# Patient Record
Sex: FEMALE | Race: WHITE | NOT HISPANIC OR LATINO | Employment: FULL TIME | URBAN - METROPOLITAN AREA
[De-identification: names, ages, dates, MRNs, and addresses within clinical notes are randomized per-mention and may not be internally consistent; named-entity substitution may affect disease eponyms.]

---

## 2017-09-19 ENCOUNTER — TRANSFERRED RECORDS (OUTPATIENT)
Dept: HEALTH INFORMATION MANAGEMENT | Facility: CLINIC | Age: 67
End: 2017-09-19

## 2018-01-05 ENCOUNTER — OFFICE VISIT (OUTPATIENT)
Dept: OBGYN | Facility: CLINIC | Age: 68
End: 2018-01-05
Payer: COMMERCIAL

## 2018-01-05 ENCOUNTER — RADIANT APPOINTMENT (OUTPATIENT)
Dept: MAMMOGRAPHY | Facility: CLINIC | Age: 68
End: 2018-01-05
Payer: COMMERCIAL

## 2018-01-05 ENCOUNTER — RADIANT APPOINTMENT (OUTPATIENT)
Dept: BONE DENSITY | Facility: CLINIC | Age: 68
End: 2018-01-05
Payer: COMMERCIAL

## 2018-01-05 VITALS
HEART RATE: 70 BPM | SYSTOLIC BLOOD PRESSURE: 126 MMHG | DIASTOLIC BLOOD PRESSURE: 78 MMHG | WEIGHT: 235 LBS | HEIGHT: 69 IN | BODY MASS INDEX: 34.8 KG/M2

## 2018-01-05 DIAGNOSIS — Z13.6 ENCOUNTER FOR LIPID SCREENING FOR CARDIOVASCULAR DISEASE: ICD-10-CM

## 2018-01-05 DIAGNOSIS — Z78.0 ASYMPTOMATIC POSTMENOPAUSAL STATE: ICD-10-CM

## 2018-01-05 DIAGNOSIS — Z12.31 VISIT FOR SCREENING MAMMOGRAM: ICD-10-CM

## 2018-01-05 DIAGNOSIS — Z01.818 PREOP EXAMINATION: ICD-10-CM

## 2018-01-05 DIAGNOSIS — Z01.419 ENCOUNTER FOR GYNECOLOGICAL EXAMINATION WITHOUT ABNORMAL FINDING: Primary | ICD-10-CM

## 2018-01-05 DIAGNOSIS — Z13.1 SCREENING FOR DIABETES MELLITUS: ICD-10-CM

## 2018-01-05 DIAGNOSIS — M21.612 BUNION OF LEFT FOOT: ICD-10-CM

## 2018-01-05 DIAGNOSIS — Z13.220 ENCOUNTER FOR LIPID SCREENING FOR CARDIOVASCULAR DISEASE: ICD-10-CM

## 2018-01-05 DIAGNOSIS — Z23 NEED FOR PROPHYLACTIC VACCINATION AND INOCULATION AGAINST INFLUENZA: ICD-10-CM

## 2018-01-05 LAB — HGB BLD-MCNC: 14.5 G/DL (ref 11.7–15.7)

## 2018-01-05 PROCEDURE — 77063 BREAST TOMOSYNTHESIS BI: CPT | Mod: TC

## 2018-01-05 PROCEDURE — 99397 PER PM REEVAL EST PAT 65+ YR: CPT | Mod: 25 | Performed by: NURSE PRACTITIONER

## 2018-01-05 PROCEDURE — 77067 SCR MAMMO BI INCL CAD: CPT | Mod: TC

## 2018-01-05 PROCEDURE — 90471 IMMUNIZATION ADMIN: CPT | Performed by: NURSE PRACTITIONER

## 2018-01-05 PROCEDURE — 85018 HEMOGLOBIN: CPT | Performed by: NURSE PRACTITIONER

## 2018-01-05 PROCEDURE — 36415 COLL VENOUS BLD VENIPUNCTURE: CPT | Performed by: NURSE PRACTITIONER

## 2018-01-05 PROCEDURE — 77080 DXA BONE DENSITY AXIAL: CPT | Performed by: OBSTETRICS & GYNECOLOGY

## 2018-01-05 PROCEDURE — G0145 SCR C/V CYTO,THINLAYER,RESCR: HCPCS | Performed by: NURSE PRACTITIONER

## 2018-01-05 PROCEDURE — 90662 IIV NO PRSV INCREASED AG IM: CPT | Performed by: NURSE PRACTITIONER

## 2018-01-05 PROCEDURE — 87624 HPV HI-RISK TYP POOLED RSLT: CPT | Performed by: NURSE PRACTITIONER

## 2018-01-05 RX ORDER — LISINOPRIL 5 MG/1
TABLET ORAL
Refills: 0 | COMMUNITY
Start: 2018-01-04 | End: 2020-01-09

## 2018-01-05 ASSESSMENT — PATIENT HEALTH QUESTIONNAIRE - PHQ9
SUM OF ALL RESPONSES TO PHQ QUESTIONS 1-9: 0
5. POOR APPETITE OR OVEREATING: NOT AT ALL

## 2018-01-05 ASSESSMENT — ANXIETY QUESTIONNAIRES
IF YOU CHECKED OFF ANY PROBLEMS ON THIS QUESTIONNAIRE, HOW DIFFICULT HAVE THESE PROBLEMS MADE IT FOR YOU TO DO YOUR WORK, TAKE CARE OF THINGS AT HOME, OR GET ALONG WITH OTHER PEOPLE: NOT DIFFICULT AT ALL
1. FEELING NERVOUS, ANXIOUS, OR ON EDGE: NOT AT ALL
2. NOT BEING ABLE TO STOP OR CONTROL WORRYING: NOT AT ALL
5. BEING SO RESTLESS THAT IT IS HARD TO SIT STILL: NOT AT ALL
3. WORRYING TOO MUCH ABOUT DIFFERENT THINGS: NOT AT ALL
7. FEELING AFRAID AS IF SOMETHING AWFUL MIGHT HAPPEN: NOT AT ALL
GAD7 TOTAL SCORE: 0
6. BECOMING EASILY ANNOYED OR IRRITABLE: NOT AT ALL

## 2018-01-05 NOTE — MR AVS SNAPSHOT
"              After Visit Summary   1/5/2018    Merrily Goodell    MRN: [id number]           Patient Information     Date Of Birth          1950        Visit Information        Provider Department      1/5/2018 2:30 PM Fara Posada APRN CNP [facility]        Today's Diagnoses     Encounter for gynecological examination without abnormal finding    -  1    Preop examination        Bunion of left foot        Encounter for lipid screening for cardiovascular disease        Screening for diabetes mellitus           Follow-ups after your visit        Future tests that were ordered for you today     Open Future Orders        Priority Expected Expires Ordered    Lipid Profile (Chol, Trig, HDL, LDL calc) Routine  1/5/2019 1/5/2018    Glucose, whole blood Routine  1/5/2019 1/5/2018            Who to contact     If you have questions or need follow up information about today's clinic visit or your schedule please contact [facility] directly at [phone].  Normal or non-critical lab and imaging results will be communicated to you by MyChart, letter or phone within 4 business days after the clinic has received the results. If you do not hear from us within 7 days, please contact the clinic through TRADE TO REBATEhart or phone. If you have a critical or abnormal lab result, we will notify you by phone as soon as possible.  Submit refill requests through Seeqpod or call your pharmacy and they will forward the refill request to us. Please allow 3 business days for your refill to be completed.          Additional Information About Your Visit        TRADE TO REBATEhart Information     Seeqpod lets you send messages to your doctor, view your test results, renew your prescriptions, schedule appointments and more. To sign up, go to [email or web address] . Click on \"Log in\" on the left side of the screen, which will take you to the Welcome page. Then click on \"Sign up Now\" on the right side of the page. " "    You will be asked to enter the access code listed below, as well as some personal information. Please follow the directions to create your username and password.     Your access code is: [email or web address]  Expires: 2018  3:13 PM     Your access code will  in 90 days. If you need help or a new code, please call your [place] clinic or [phone].        Care EveryWhere ID     This is your Care EveryWhere ID. This could be used by other organizations to access your [facility] medical records  [id number]        Your Vitals Were     Pulse Height BMI (Body Mass Index)             70 5' 9\" (1.753 m) 34.7 kg/m2          Blood Pressure from Last 3 Encounters:   18 126/78   06/24/15 120/82    Weight from Last 3 Encounters:   18 235 lb (106.6 kg)   06/24/15 202 lb (91.6 kg)              We Performed the Following     Hemoglobin     HPV High Risk Types DNA Cervical     Pap imaged thin layer screen with HPV - recommended age 30 - 65        Primary Care Provider Office Phone # Fax #    [facility] [phone]       [facility] [address] [place] [phone]        Equal Access to Services     FRANTZ CASTANO : Hadii suraj ku hadasho Soomaali, waaxda luqadaha, qaybta kaalmada adeegyada, waxay linda pabon. So [facility] [phone].    ATENCIÓN: Si habla español, tiene a dove disposición servicios gratuitos de asistencia lingüística. Llame al [phone].    We comply with applicable federal civil rights laws and [place] laws. We do not discriminate on the basis of race, color, national origin, age, disability, sex, sexual orientation, or gender identity.            Thank you!     Thank you for choosing [facility] [place]  for your care. Our goal is always to provide you with excellent care. Hearing back from our patients is one way we can continue to improve our services. Please take a few minutes to " complete the written survey that you may receive in the mail after your visit with us. Thank you!             Your Updated Medication List - Protect others around you: Learn how to safely use, store and throw away your medicines at www.disposemymeds.org.          This list is accurate as of: 1/5/18  3:13 PM.  Always use your most recent med list.                   Brand Name Dispense Instructions for use Diagnosis    CENTRUM SILVER per tablet      Take 1 tablet by mouth daily        lisinopril 5 MG tablet    PRINIVIL/ZESTRIL          OMEGA-3 FISH OIL PO      Take by mouth daily        OSTEO BI-FLEX TRIPLE STRENGTH Tabs      Take 1 tablet by mouth daily

## 2018-01-05 NOTE — LETTER
January 11, 2018    Merrily Goodell  [address] [place] [id number]    Dear Lm,  We are happy to inform you that your PAP smear result from 1/5/18 is normal.  We are now able to do a follow up test on PAP smears. The DNA test is for HPV (Human Papilloma Virus). Cervical cancer is closely linked with certain types of HPV. Your result showed no evidence of high risk HPV.  Therefore we recommend you return in 3 years for your next pap smear.  You will still need to return to the clinic every year for an annual exam and other preventive tests.  Please contact the clinic at [phone] with any questions.  Sincerely,    MEENU Byrne CNP/rlm

## 2018-01-05 NOTE — PROGRESS NOTES
Lm is a 67 year old  female who presents for annual exam.     Besides routine health maintenance, she has no other health concerns today .    Do you have a Health Care Directive?: No: Advance care planning was reviewed with patient; patient declined at this time.      Fall risk:   Fallen 2 or more times in the past year?: No  Any fall with injury in the past year?: No      HPI:  The patient's PCP is [facility].  Pt here today for her annual exam and mammogram. She also needs a preop form filled out for her left foot bunion surgery next week by Dr. Duff.     She denies menopausal symptoms. No vaginal bleeding.     She is also having her dexa scan today.     GYNECOLOGIC HISTORY:No LMP recorded. Patient is postmenopausal..   reports that she has never smoked. She has never used smokeless tobacco.      Patient is sexually active.  STD testing offered?  Declined  Last PHQ-9 score on record=   PHQ-9 SCORE 2018   Total Score 0     Last GAD7 score on record=   ABHI-7 SCORE 2018   Total Score 0     Alcohol Score = 1    HEALTH MAINTENANCE:  Cholesterol: 14   Total= 174, Triglycerides=74, HDL=44, XNM=982, FBS=95, TSH=2.61- done on 06/24/15  Last Mammo: 06/24/15, Result: normal, Next Mammo: today   Pap:   Lab Results   Component Value Date    PAP NIL 2015      DEXA:  06/24/15 osteopenia, repeating today  Colonoscopy:  06/15/15, Result:  suboptimal prep, Next Colonoscopy: 2 years.    Health maintenance updated:  yes    HISTORY:  Obstetric History       T0      L3     SAB0   TAB0   Ectopic0   Multiple0   Live Births0       # Outcome Date GA Lbr Adrien/2nd Weight Sex Delivery Anes PTL Lv   3 Para            2 Para            1 Para                 There is no problem list on file for this patient.    Past Surgical History:   Procedure Laterality Date     ELBOW SURGERY       OOPHORECTOMY Left      OOPHORECTOMY Right      TONSILLECTOMY        Social History  "  Substance Use Topics     Smoking status: Never Smoker     Smokeless tobacco: Never Used     Alcohol use Yes           Current Outpatient Prescriptions   Medication Sig     lisinopril (PRINIVIL/ZESTRIL) 5 MG tablet      Multiple Vitamins-Minerals (CENTRUM SILVER) per tablet Take 1 tablet by mouth daily     Omega-3 Fatty Acids (OMEGA-3 FISH OIL PO) Take by mouth daily     Misc Natural Products (OSTEO BI-FLEX TRIPLE STRENGTH) TABS Take 1 tablet by mouth daily     No current facility-administered medications for this visit.        Allergies   Allergen Reactions     Shellfish-Derived Products Anaphylaxis       Past medical, surgical, social and family history were reviewed and updated in EPIC.    ROS:   12 point review of systems negative other than symptoms noted below.  Constitutional: Weight Gain    EXAM:  /78  Pulse 70  Ht 5' 9\" (1.753 m)  Wt 235 lb (106.6 kg)  BMI 34.7 kg/m2   BMI: Body mass index is 34.7 kg/(m^2).    EXAM:  Constitutional: Appearance: Well nourished, well developed alert, in no acute distress  Neck:  Lymph Nodes:  No lymphadenopathy present    Thyroid:  Gland size normal, nontender, no nodules or masses present  on palpation  Chest:  Respiratory Effort:  Breathing unlabored  Cardiovascular:Heart    Auscultation:  Regular rate, normal rhythm, no murmurs present  Breasts: Inspection of Breasts:  No lymphadenopathy present., Palpation of Breasts and Axillae:  No masses present on palpation, no breast tenderness., Axillary Lymph Nodes:  No lymphadenopathy present. and No nodularity, asymmetry or nipple discharge bilaterally.  Gastrointestinal:  Abdominal Examination:  Abdomen nontender to palpation, tone normal without     rigidity or guarding, no masses present, umbilicus without lesions    Liver and speen:  No hepatomegaly present, liver nontender to palpation    Hernias:  No hernias present  Lymphatic: Lymph Nodes:  No other lymphadenopathy present  Skin:  General Inspection:  No rashes " present, no lesions present, no areas of  discoloration.    Genitalia and Groin:  No rashes present, no lesions present, no areas of  discoloration, no masses present  Neurologic/Psychiatric:    Mental Status:  Oriented X3     Pelvic Exam:  External Genitalia:     Normal appearance for age, no discharge present, no tenderness present, no inflammatory lesions present, color normal  Vagina:     Normal vaginal vault without central or paravaginal defects, no discharge present, no inflammatory lesions present, no masses present  Bladder:     Nontender to palpation  Urethra:   Urethral Body:  Urethra palpation normal, urethra structural support normal   Urethral Meatus:  No erythema or lesions present  Cervix:     Appearance healthy, no lesions present, nontender to palpation, no bleeding present  Uterus:     Uterus: firm, normal sized and nontender, anteverted in position.   Adnexa:   Surgically absent  Perineum:     Perineum within normal limits, no evidence of trauma, no rashes or skin lesions present  Anus:     Anus within normal limits, no hemorrhoids present  Inguinal Lymph Nodes:     No lymphadenopathy present  Pubic Hair:     Normal pubic hair distribution for age  Genitalia and Groin:     No rashes present, no lesions present, no areas of discoloration, no masses present      COUNSELING:   Special attention given to:       Regular exercise       Healthy diet/nutrition       Colon cancer screening    BMI:  Body mass index is 34.7 kg/(m^2).  Weight management plan: Discussed healthy diet and exercise guidelines and patient will follow up in 12 months in clinic to re-evaluate.   reports that she has never smoked. She has never used smokeless tobacco.        ASSESSMENT:  67 year old female with satisfactory annual exam.    ICD-10-CM    1. Encounter for gynecological examination without abnormal finding Z01.419 Pap imaged thin layer screen with HPV - recommended age 30 - 65     HPV High Risk Types DNA Cervical   2.  Preop examination Z01.818 Hemoglobin   3. Bunion of left foot M21.612 Hemoglobin   4. Encounter for lipid screening for cardiovascular disease Z13.220 Lipid Profile (Chol, Trig, HDL, LDL calc)    Z13.6    5. Screening for diabetes mellitus Z13.1 Glucose, whole blood       PLAN:  Normal postmenopausal gyn exam. Hemoglobin for preop. Ok to have bunion surgery. Fasting labs in future.  Doing well with lisinopril. DEXA reviewed. Still osteopenic, normal range for her age. Continue calcium and vit d especially with her pending foot surgery.    MEENU Byrne CNP

## 2018-01-05 NOTE — PROGRESS NOTES

## 2018-01-06 ASSESSMENT — ANXIETY QUESTIONNAIRES: GAD7 TOTAL SCORE: 0

## 2018-01-09 ENCOUNTER — TRANSFERRED RECORDS (OUTPATIENT)
Dept: HEALTH INFORMATION MANAGEMENT | Facility: CLINIC | Age: 68
End: 2018-01-09

## 2018-01-09 LAB
COPATH REPORT: NORMAL
PAP: NORMAL

## 2018-01-10 LAB
FINAL DIAGNOSIS: NORMAL
HPV HR 12 DNA CVX QL NAA+PROBE: NEGATIVE
HPV16 DNA SPEC QL NAA+PROBE: NEGATIVE
HPV18 DNA SPEC QL NAA+PROBE: NEGATIVE
SPECIMEN DESCRIPTION: NORMAL

## 2018-01-11 PROBLEM — Z12.4 CERVICAL CANCER SCREENING: Status: ACTIVE | Noted: 2018-01-11

## 2018-02-22 ENCOUNTER — TRANSFERRED RECORDS (OUTPATIENT)
Dept: HEALTH INFORMATION MANAGEMENT | Facility: CLINIC | Age: 68
End: 2018-02-22

## 2018-04-25 ENCOUNTER — TRANSFERRED RECORDS (OUTPATIENT)
Dept: HEALTH INFORMATION MANAGEMENT | Facility: CLINIC | Age: 68
End: 2018-04-25

## 2018-11-03 ENCOUNTER — TRANSFERRED RECORDS (OUTPATIENT)
Dept: HEALTH INFORMATION MANAGEMENT | Facility: CLINIC | Age: 68
End: 2018-11-03

## 2019-01-11 ENCOUNTER — TELEPHONE (OUTPATIENT)
Dept: OBGYN | Facility: CLINIC | Age: 69
End: 2019-01-11

## 2019-01-11 NOTE — TELEPHONE ENCOUNTER
1st attempt. Consent to PHI. Called patient regarding overdue labs. No answer left vm message to schedule lab appt. Labs extended 3 months.

## 2019-12-13 ENCOUNTER — TELEPHONE (OUTPATIENT)
Dept: OBGYN | Facility: CLINIC | Age: 69
End: 2019-12-13

## 2019-12-13 DIAGNOSIS — Z13.1 SCREENING FOR DIABETES MELLITUS: Primary | ICD-10-CM

## 2019-12-13 NOTE — TELEPHONE ENCOUNTER
Patient requesting A1C to be done, can you place an order? Did not want to wait until her physical in Jan. Has a lab appt on 12/16 and has an appt for a flu shot.

## 2019-12-16 ENCOUNTER — ALLIED HEALTH/NURSE VISIT (OUTPATIENT)
Dept: NURSING | Facility: CLINIC | Age: 69
End: 2019-12-16
Payer: COMMERCIAL

## 2019-12-16 DIAGNOSIS — Z13.1 SCREENING FOR DIABETES MELLITUS: ICD-10-CM

## 2019-12-16 DIAGNOSIS — Z13.6 ENCOUNTER FOR LIPID SCREENING FOR CARDIOVASCULAR DISEASE: ICD-10-CM

## 2019-12-16 DIAGNOSIS — Z13.220 ENCOUNTER FOR LIPID SCREENING FOR CARDIOVASCULAR DISEASE: ICD-10-CM

## 2019-12-16 DIAGNOSIS — Z23 NEED FOR PROPHYLACTIC VACCINATION AND INOCULATION AGAINST INFLUENZA: Primary | ICD-10-CM

## 2019-12-16 LAB
CHOLEST SERPL-MCNC: 222 MG/DL
GLUCOSE BLD-MCNC: 98 MG/DL (ref 70–99)
HDLC SERPL-MCNC: 58 MG/DL
LDLC SERPL CALC-MCNC: 148 MG/DL
NONHDLC SERPL-MCNC: 164 MG/DL
TRIGL SERPL-MCNC: 80 MG/DL

## 2019-12-16 PROCEDURE — 80061 LIPID PANEL: CPT | Performed by: NURSE PRACTITIONER

## 2019-12-16 PROCEDURE — 90471 IMMUNIZATION ADMIN: CPT

## 2019-12-16 PROCEDURE — 90662 IIV NO PRSV INCREASED AG IM: CPT

## 2019-12-16 PROCEDURE — 36415 COLL VENOUS BLD VENIPUNCTURE: CPT | Performed by: NURSE PRACTITIONER

## 2019-12-16 PROCEDURE — 82947 ASSAY GLUCOSE BLOOD QUANT: CPT | Performed by: NURSE PRACTITIONER

## 2019-12-16 NOTE — LETTER
12/17/2019     Merrily Goodell  [address] [facility] [phone]        Merrily Goodell your lab results came back slightly abnormal. Your cholesterol panel isn't too bad this year. Watch your diet and increase your exercise. Your fasting blood sugar was normal.       Results for orders placed or performed in visit on 12/16/19   Lipid Profile     Status: Abnormal   Result Value Ref Range    Cholesterol 222 (H) <200 mg/dL    Triglycerides 80 <150 mg/dL    HDL Cholesterol 58 >49 mg/dL    LDL Cholesterol Calculated 148 (H) <100 mg/dL    Non HDL Cholesterol 164 (H) <130 mg/dL   Glucose whole blood     Status: None   Result Value Ref Range    Glucose Whole Blood 98 70 - 99 mg/dL       We'll see you in a few weeks and we can discuss further if need be.    Cordially,    MEENU Byrne CNP

## 2020-01-08 NOTE — PROGRESS NOTES
Lm is a 69 year old  female who presents for annual exam.     Besides routine health maintenance, has a pre-op form to be filled out.    Do you have a Health Care Directive?: No, advance care planning information given to patient to review.  Advanced care planning was discussed at today's visit.    Fall risk:   Fallen 2 or more times in the past year?: No  Any fall with injury in the past year?: No    HPI:  The patient's PCP is  [facility].  Pt here today for her annual gyn exam and mammogram. She is also having her 2nd bunion surgery tomorrow. She had her left foot done last year and plans on her right foot tomorrow with a hardware removal on her left foot with DR. Kamara at [facility].    She has been seeing cardiology for HTN and was on lisinopril but developed itching/bug crawling sensation so she was changed to losartan low dose. Her BP is elevated today.    Denies vaginal bleeding. Pap NIL in 2018. Requests pap screenign today then will space them out.     GYNECOLOGIC HISTORY:  No LMP recorded. Patient is postmenopausal..   reports that she has never smoked. She has never used smokeless tobacco.    Patient is not sexually active.  STD testing offered?  Declined  Last PHQ-9 score on record=   PHQ-9 SCORE 2020   PHQ-9 Total Score 1     Last GAD7 score on record=   ABHI-7 SCORE 2018   Total Score 0 2     Alcohol Score = 1    HEALTH MAINTENANCE:  Cholesterol:   Cholesterol   Date Value Ref Range Status   2019 222 (H) <200 mg/dL Final     Comment:     Desirable:       <200 mg/dl      Last Mammo: One year ago, Result: Normal, Next Mammo: Today   Pap:   Lab Results   Component Value Date    PAP NIL HPV- 2018    PAP NIL 2015      DEXA:  18  Colonoscopy:  6/15/15, Result:  Normal, but suboptimal prep, Next Colonoscopy: will schedule this year years.    Health maintenance updated:  yes    HISTORY:  OB History    Para Term  AB Living   3  "3 0 0 0 3   SAB TAB Ectopic Multiple Live Births   0 0 0 0 0      # Outcome Date GA Lbr Adrien/2nd Weight Sex Delivery Anes PTL Lv   3 Para            2 Para            1 Para              Patient Active Problem List   Diagnosis     Cervical cancer screening     Past Surgical History:   Procedure Laterality Date     ELBOW SURGERY       OOPHORECTOMY Left      OOPHORECTOMY Right      TONSILLECTOMY        Social History     Tobacco Use     Smoking status: Never Smoker     Smokeless tobacco: Never Used   Substance Use Topics     Alcohol use: Yes      Problem (# of Occurrences) Relation (Name,Age of Onset)    Alcoholism (2) Maternal Grandmother, Brother    Cerebrovascular Disease (1) Paternal Grandmother    Colon Cancer (1) Father    Heart Disease (1) Maternal Grandfather    Heart Failure (1) Mother    Lung Cancer (1) Paternal Grandfather    Myocardial Infarction (1) Brother            Current Outpatient Medications   Medication Sig     losartan (COZAAR) 25 MG tablet TAKE 1 TABLET BY MOUTH ONCE DAILY. REPLACING LISINOPRIL     Misc Natural Products (OSTEO BI-FLEX TRIPLE STRENGTH) TABS Take 1 tablet by mouth daily     Multiple Vitamins-Minerals (CENTRUM SILVER) per tablet Take 1 tablet by mouth daily     Omega-3 Fatty Acids (OMEGA-3 FISH OIL PO) Take by mouth daily     UNABLE TO FIND MEDICATION NAME: tumeric curcumin     UNABLE TO FIND MEDICATION NAME: beet root     No current facility-administered medications for this visit.        Allergies   Allergen Reactions     Shellfish-Derived Products Anaphylaxis       Past medical, surgical, social and family history were reviewed and updated in EPIC.    ROS:   12 point review of systems negative other than symptoms noted below or in the HPI.  No urinary frequency or dysuria, bladder or kidney problems    EXAM:  BP (!) 158/90   Pulse 80   Ht 1.734 m (5' 8.25\")   Wt 108.4 kg (239 lb)   BMI 36.07 kg/m     BMI: Body mass index is 36.07 kg/m .    EXAM:  Constitutional: Appearance: " Well nourished, well developed alert, in no acute distress  Neck:  Lymph Nodes:  No lymphadenopathy present    Thyroid:  Gland size normal, nontender, no nodules or masses present  on palpation  Chest:  Respiratory Effort:  Breathing unlabored, chest clear to percussion and auscultation in all fields  Cardiovascular:Heart    Auscultation:  Regular rate, normal rhythm, no murmurs present  Breasts: Inspection of Breasts:  No lymphadenopathy present., Palpation of Breasts and Axillae:  No masses present on palpation, no breast tenderness., Axillary Lymph Nodes:  No lymphadenopathy present. and No nodularity, asymmetry or nipple discharge bilaterally.  Gastrointestinal:  Abdominal Examination:  Abdomen nontender to palpation, tone normal without     rigidity or guarding, no masses present, umbilicus without lesions    Liver and speen:  No hepatomegaly present, liver nontender to palpation    Hernias:  No hernias present  Lymphatic: Lymph Nodes:  No other lymphadenopathy present  Skin:  General Inspection:  No rashes present, no lesions present, no areas of  discoloration.    Genitalia and Groin:  No rashes present, no lesions present, no areas of  discoloration, no masses present  Neurologic/Psychiatric:    Mental Status:  Oriented X3     Pelvic Exam:  External Genitalia:     Normal appearance for age, no discharge present, no tenderness present, no inflammatory lesions present, color normal  Vagina:     Normal vaginal vault without central or paravaginal defects, no discharge present, no inflammatory lesions present, no masses present  Bladder:     Nontender to palpation  Urethra:   Urethral Body:  Urethra palpation normal, urethra structural support normal   Urethral Meatus:  No erythema or lesions present  Cervix:     Appearance healthy, no lesions present, nontender to palpation, no bleeding present  Uterus:     Uterus: firm, normal sized and nontender, midplane in position.   Adnexa:     Surgically  absent.  Perineum:     Perineum within normal limits, no evidence of trauma, no rashes or skin lesions present  Anus:     Anus within normal limits, no hemorrhoids present  Inguinal Lymph Nodes:     No lymphadenopathy present  Pubic Hair:     Normal pubic hair distribution for age  Genitalia and Groin:     No rashes present, no lesions present, no areas of discoloration, no masses present      COUNSELING:   Special attention given to:       Regular exercise       Healthy diet/nutrition       Bladder control       Osteoporosis Prevention/Bone Health    BMI:  Body mass index is 36.07 kg/m .  Weight management plan: Discussed healthy diet and exercise guidelines   reports that she has never smoked. She has never used smokeless tobacco.      ASSESSMENT:  69 year old female with satisfactory annual exam.    ICD-10-CM    1. Encounter for gynecological examination without abnormal finding Z01.419 Pap imaged thin layer screen with HPV - recommended age 30 - 65 years (select HPV order below)     HPV High Risk Types DNA Cervical   2. Preop examination Z01.818    3. Bunion of right foot M21.611        PLAN:  Normal gyn exam. BP elevated today. She is cleared for surgery tomorrow provided her cardiologist is okay with her Blood pressure. She is due to see them on 1/16/2020. Pap collected. Pap every 3 years if NIL today.     MEENU Byrne CNP

## 2020-01-09 ENCOUNTER — OFFICE VISIT (OUTPATIENT)
Dept: OBGYN | Facility: CLINIC | Age: 70
End: 2020-01-09
Payer: COMMERCIAL

## 2020-01-09 ENCOUNTER — ANCILLARY PROCEDURE (OUTPATIENT)
Dept: MAMMOGRAPHY | Facility: CLINIC | Age: 70
End: 2020-01-09
Payer: COMMERCIAL

## 2020-01-09 VITALS
HEIGHT: 68 IN | WEIGHT: 239 LBS | DIASTOLIC BLOOD PRESSURE: 90 MMHG | HEART RATE: 80 BPM | BODY MASS INDEX: 36.22 KG/M2 | SYSTOLIC BLOOD PRESSURE: 158 MMHG

## 2020-01-09 DIAGNOSIS — M21.611 BUNION OF RIGHT FOOT: ICD-10-CM

## 2020-01-09 DIAGNOSIS — Z01.818 PREOP EXAMINATION: ICD-10-CM

## 2020-01-09 DIAGNOSIS — Z12.31 VISIT FOR SCREENING MAMMOGRAM: ICD-10-CM

## 2020-01-09 DIAGNOSIS — Z01.419 ENCOUNTER FOR GYNECOLOGICAL EXAMINATION WITHOUT ABNORMAL FINDING: Primary | ICD-10-CM

## 2020-01-09 PROCEDURE — 77063 BREAST TOMOSYNTHESIS BI: CPT | Mod: TC

## 2020-01-09 PROCEDURE — 77067 SCR MAMMO BI INCL CAD: CPT | Mod: TC

## 2020-01-09 PROCEDURE — 99397 PER PM REEVAL EST PAT 65+ YR: CPT | Performed by: NURSE PRACTITIONER

## 2020-01-09 PROCEDURE — 87624 HPV HI-RISK TYP POOLED RSLT: CPT | Performed by: NURSE PRACTITIONER

## 2020-01-09 PROCEDURE — G0145 SCR C/V CYTO,THINLAYER,RESCR: HCPCS | Performed by: NURSE PRACTITIONER

## 2020-01-09 RX ORDER — LOSARTAN POTASSIUM 25 MG/1
TABLET ORAL
Refills: 3 | Status: ON HOLD | COMMUNITY
Start: 2019-06-16 | End: 2023-01-16

## 2020-01-09 ASSESSMENT — ANXIETY QUESTIONNAIRES
7. FEELING AFRAID AS IF SOMETHING AWFUL MIGHT HAPPEN: NOT AT ALL
1. FEELING NERVOUS, ANXIOUS, OR ON EDGE: SEVERAL DAYS
IF YOU CHECKED OFF ANY PROBLEMS ON THIS QUESTIONNAIRE, HOW DIFFICULT HAVE THESE PROBLEMS MADE IT FOR YOU TO DO YOUR WORK, TAKE CARE OF THINGS AT HOME, OR GET ALONG WITH OTHER PEOPLE: SOMEWHAT DIFFICULT
3. WORRYING TOO MUCH ABOUT DIFFERENT THINGS: NOT AT ALL
5. BEING SO RESTLESS THAT IT IS HARD TO SIT STILL: SEVERAL DAYS
GAD7 TOTAL SCORE: 2
6. BECOMING EASILY ANNOYED OR IRRITABLE: NOT AT ALL
2. NOT BEING ABLE TO STOP OR CONTROL WORRYING: NOT AT ALL

## 2020-01-09 ASSESSMENT — PATIENT HEALTH QUESTIONNAIRE - PHQ9
5. POOR APPETITE OR OVEREATING: NOT AT ALL
SUM OF ALL RESPONSES TO PHQ QUESTIONS 1-9: 1

## 2020-01-09 ASSESSMENT — MIFFLIN-ST. JEOR: SCORE: 1671.57

## 2020-01-09 NOTE — LETTER
January 20, 2020      Merrily Goodell  [address] [place] [phone]    Dear Ms.Goodell,      I am happy to inform you that your cervical cancer screening test (PAP smear) was normal and your Human Papillomavirus (HPV) test was negative.    Per current guidelines, you no longer need to have pap smears completed. Please return for annual pelvic exams.    Please continue to be seen every year for an annual wellness visit and other preventative tests.     If you have additional questions regarding this result, please call our registered nurse, Shannon at [phone].    Sincerely,      Fara Posada, APRN CNP/rlm

## 2020-01-10 LAB — EJECTION FRACTION: 60 %

## 2020-01-10 ASSESSMENT — ANXIETY QUESTIONNAIRES: GAD7 TOTAL SCORE: 2

## 2020-01-15 LAB
COPATH REPORT: NORMAL
PAP: NORMAL

## 2020-01-16 ENCOUNTER — TRANSFERRED RECORDS (OUTPATIENT)
Dept: HEALTH INFORMATION MANAGEMENT | Facility: CLINIC | Age: 70
End: 2020-01-16

## 2020-01-20 LAB
FINAL DIAGNOSIS: NORMAL
HPV HR 12 DNA CVX QL NAA+PROBE: NEGATIVE
HPV16 DNA SPEC QL NAA+PROBE: NEGATIVE
HPV18 DNA SPEC QL NAA+PROBE: NEGATIVE
SPECIMEN DESCRIPTION: NORMAL
SPECIMEN SOURCE CVX/VAG CYTO: NORMAL

## 2020-01-31 ENCOUNTER — TRANSFERRED RECORDS (OUTPATIENT)
Dept: HEALTH INFORMATION MANAGEMENT | Facility: CLINIC | Age: 70
End: 2020-01-31

## 2020-02-19 ENCOUNTER — TRANSFERRED RECORDS (OUTPATIENT)
Dept: HEALTH INFORMATION MANAGEMENT | Facility: CLINIC | Age: 70
End: 2020-02-19

## 2020-03-04 ENCOUNTER — TRANSFERRED RECORDS (OUTPATIENT)
Dept: HEALTH INFORMATION MANAGEMENT | Facility: CLINIC | Age: 70
End: 2020-03-04

## 2020-03-13 ENCOUNTER — TRANSFERRED RECORDS (OUTPATIENT)
Dept: HEALTH INFORMATION MANAGEMENT | Facility: CLINIC | Age: 70
End: 2020-03-13

## 2020-04-02 ENCOUNTER — TRANSFERRED RECORDS (OUTPATIENT)
Dept: HEALTH INFORMATION MANAGEMENT | Facility: CLINIC | Age: 70
End: 2020-04-02

## 2020-11-03 ENCOUNTER — TELEPHONE (OUTPATIENT)
Dept: OBGYN | Facility: CLINIC | Age: 70
End: 2020-11-03

## 2020-11-03 NOTE — TELEPHONE ENCOUNTER
Mariah (home health care coordinator) from [facility] called regarding this mutual patient. Asked if roxana is okay with following home care through phone and fax.  Phone number: [phone]

## 2020-11-04 NOTE — TELEPHONE ENCOUNTER
At this point, given her complexity, she really should establish with an IM provider. I am happy as always to see her yearly. But she should have a general practice provider to captain her ship.

## 2020-11-04 NOTE — TELEPHONE ENCOUNTER
Left message for Mariah Collins instructions below.    Kanika Jerome RN on 11/4/2020 at 10:36 AM

## 2020-11-11 ENCOUNTER — MEDICAL CORRESPONDENCE (OUTPATIENT)
Dept: HEALTH INFORMATION MANAGEMENT | Facility: CLINIC | Age: 70
End: 2020-11-11

## 2020-11-20 ENCOUNTER — MEDICAL CORRESPONDENCE (OUTPATIENT)
Dept: HEALTH INFORMATION MANAGEMENT | Facility: CLINIC | Age: 70
End: 2020-11-20

## 2020-12-02 ENCOUNTER — MEDICAL CORRESPONDENCE (OUTPATIENT)
Dept: HEALTH INFORMATION MANAGEMENT | Facility: CLINIC | Age: 70
End: 2020-12-02

## 2020-12-08 ENCOUNTER — MEDICAL CORRESPONDENCE (OUTPATIENT)
Dept: HEALTH INFORMATION MANAGEMENT | Facility: CLINIC | Age: 70
End: 2020-12-08

## 2020-12-17 ENCOUNTER — MEDICAL CORRESPONDENCE (OUTPATIENT)
Dept: HEALTH INFORMATION MANAGEMENT | Facility: CLINIC | Age: 70
End: 2020-12-17

## 2021-01-12 ENCOUNTER — MEDICAL CORRESPONDENCE (OUTPATIENT)
Dept: HEALTH INFORMATION MANAGEMENT | Facility: CLINIC | Age: 71
End: 2021-01-12

## 2021-02-09 ENCOUNTER — MEDICAL CORRESPONDENCE (OUTPATIENT)
Dept: HEALTH INFORMATION MANAGEMENT | Facility: CLINIC | Age: 71
End: 2021-02-09

## 2021-05-24 ENCOUNTER — RECORDS - HEALTHEAST (OUTPATIENT)
Dept: ADMINISTRATIVE | Facility: CLINIC | Age: 71
End: 2021-05-24

## 2022-10-04 ENCOUNTER — TRANSFERRED RECORDS (OUTPATIENT)
Dept: HEALTH INFORMATION MANAGEMENT | Facility: CLINIC | Age: 72
End: 2022-10-04

## 2022-10-04 LAB — EJECTION FRACTION: 62 %

## 2022-12-15 ENCOUNTER — TRANSFERRED RECORDS (OUTPATIENT)
Dept: HEALTH INFORMATION MANAGEMENT | Facility: CLINIC | Age: 72
End: 2022-12-15

## 2023-01-13 ENCOUNTER — ANESTHESIA EVENT (OUTPATIENT)
Dept: SURGERY | Facility: CLINIC | Age: 73
End: 2023-01-13
Payer: COMMERCIAL

## 2023-01-13 RX ORDER — AMLODIPINE BESYLATE 2.5 MG/1
2.5 TABLET ORAL DAILY
COMMUNITY
End: 2024-01-23

## 2023-01-13 RX ORDER — OMEGA-3 FATTY ACIDS/FISH OIL 300-1000MG
200 CAPSULE ORAL EVERY 4 HOURS PRN
COMMUNITY

## 2023-01-13 RX ORDER — ATORVASTATIN CALCIUM 40 MG/1
40 TABLET, FILM COATED ORAL DAILY
COMMUNITY
End: 2024-01-23

## 2023-01-13 RX ORDER — ASPIRIN 81 MG/1
81 TABLET ORAL DAILY
COMMUNITY

## 2023-01-13 RX ORDER — METOPROLOL TARTRATE 25 MG/1
25 TABLET, FILM COATED ORAL 2 TIMES DAILY
COMMUNITY
End: 2024-01-23

## 2023-01-13 RX ORDER — ACETAMINOPHEN 500 MG
500-1000 TABLET ORAL EVERY 6 HOURS PRN
COMMUNITY
End: 2024-01-23

## 2023-01-16 ENCOUNTER — APPOINTMENT (OUTPATIENT)
Dept: GENERAL RADIOLOGY | Facility: CLINIC | Age: 73
End: 2023-01-16
Attending: ORTHOPAEDIC SURGERY
Payer: COMMERCIAL

## 2023-01-16 ENCOUNTER — HOSPITAL ENCOUNTER (OUTPATIENT)
Facility: CLINIC | Age: 73
Discharge: HOME | End: 2023-01-16
Attending: ORTHOPAEDIC SURGERY | Admitting: ORTHOPAEDIC SURGERY
Payer: COMMERCIAL

## 2023-01-16 ENCOUNTER — ANESTHESIA (OUTPATIENT)
Dept: SURGERY | Facility: CLINIC | Age: 73
End: 2023-01-16
Payer: COMMERCIAL

## 2023-01-16 VITALS
RESPIRATION RATE: 16 BRPM | BODY MASS INDEX: 38.51 KG/M2 | DIASTOLIC BLOOD PRESSURE: 78 MMHG | OXYGEN SATURATION: 97 % | HEIGHT: 69 IN | SYSTOLIC BLOOD PRESSURE: 153 MMHG | WEIGHT: 260 LBS | TEMPERATURE: 97.1 F | HEART RATE: 71 BPM

## 2023-01-16 DIAGNOSIS — Z98.890 S/P FOOT SURGERY: Primary | ICD-10-CM

## 2023-01-16 PROCEDURE — 258N000003 HC RX IP 258 OP 636: Performed by: NURSE ANESTHETIST, CERTIFIED REGISTERED

## 2023-01-16 PROCEDURE — 250N000025 HC SEVOFLURANE, PER MIN: Performed by: ORTHOPAEDIC SURGERY

## 2023-01-16 PROCEDURE — 710N000009 HC RECOVERY PHASE 1, LEVEL 1, PER MIN: Performed by: ORTHOPAEDIC SURGERY

## 2023-01-16 PROCEDURE — 999N000141 HC STATISTIC PRE-PROCEDURE NURSING ASSESSMENT: Performed by: ORTHOPAEDIC SURGERY

## 2023-01-16 PROCEDURE — 250N000013 HC RX MED GY IP 250 OP 250 PS 637: Performed by: PHYSICIAN ASSISTANT

## 2023-01-16 PROCEDURE — 250N000009 HC RX 250: Performed by: ORTHOPAEDIC SURGERY

## 2023-01-16 PROCEDURE — 250N000011 HC RX IP 250 OP 636: Performed by: NURSE ANESTHETIST, CERTIFIED REGISTERED

## 2023-01-16 PROCEDURE — C1713 ANCHOR/SCREW BN/BN,TIS/BN: HCPCS | Performed by: ORTHOPAEDIC SURGERY

## 2023-01-16 PROCEDURE — 250N000011 HC RX IP 250 OP 636: Performed by: ORTHOPAEDIC SURGERY

## 2023-01-16 PROCEDURE — 250N000009 HC RX 250: Performed by: NURSE ANESTHETIST, CERTIFIED REGISTERED

## 2023-01-16 PROCEDURE — 272N000001 HC OR GENERAL SUPPLY STERILE: Performed by: ORTHOPAEDIC SURGERY

## 2023-01-16 PROCEDURE — 271N000001 HC OR GENERAL SUPPLY NON-STERILE: Performed by: ORTHOPAEDIC SURGERY

## 2023-01-16 PROCEDURE — 999N000179 XR SURGERY CARM FLUORO LESS THAN 5 MIN W STILLS

## 2023-01-16 PROCEDURE — 250N000011 HC RX IP 250 OP 636: Performed by: ANESTHESIOLOGY

## 2023-01-16 PROCEDURE — 360N000083 HC SURGERY LEVEL 3 W/ FLUORO, PER MIN: Performed by: ORTHOPAEDIC SURGERY

## 2023-01-16 PROCEDURE — 370N000017 HC ANESTHESIA TECHNICAL FEE, PER MIN: Performed by: ORTHOPAEDIC SURGERY

## 2023-01-16 PROCEDURE — 710N000012 HC RECOVERY PHASE 2, PER MINUTE: Performed by: ORTHOPAEDIC SURGERY

## 2023-01-16 DEVICE — IMPLANTABLE DEVICE: Type: IMPLANTABLE DEVICE | Site: FOOT | Status: FUNCTIONAL

## 2023-01-16 RX ORDER — ONDANSETRON 2 MG/ML
4 INJECTION INTRAMUSCULAR; INTRAVENOUS EVERY 30 MIN PRN
Status: DISCONTINUED | OUTPATIENT
Start: 2023-01-16 | End: 2023-01-16 | Stop reason: HOSPADM

## 2023-01-16 RX ORDER — LABETALOL HYDROCHLORIDE 5 MG/ML
10 INJECTION, SOLUTION INTRAVENOUS
Status: DISCONTINUED | OUTPATIENT
Start: 2023-01-16 | End: 2023-01-16 | Stop reason: HOSPADM

## 2023-01-16 RX ORDER — ROPIVACAINE HYDROCHLORIDE 5 MG/ML
INJECTION, SOLUTION EPIDURAL; INFILTRATION; PERINEURAL PRN
Status: DISCONTINUED | OUTPATIENT
Start: 2023-01-16 | End: 2023-01-16 | Stop reason: HOSPADM

## 2023-01-16 RX ORDER — FENTANYL CITRATE 50 UG/ML
INJECTION, SOLUTION INTRAMUSCULAR; INTRAVENOUS PRN
Status: DISCONTINUED | OUTPATIENT
Start: 2023-01-16 | End: 2023-01-16

## 2023-01-16 RX ORDER — ONDANSETRON 4 MG/1
4 TABLET, ORALLY DISINTEGRATING ORAL
Status: DISCONTINUED | OUTPATIENT
Start: 2023-01-16 | End: 2023-01-16 | Stop reason: HOSPADM

## 2023-01-16 RX ORDER — FENTANYL CITRATE 0.05 MG/ML
25 INJECTION, SOLUTION INTRAMUSCULAR; INTRAVENOUS
Status: DISCONTINUED | OUTPATIENT
Start: 2023-01-16 | End: 2023-01-16 | Stop reason: HOSPADM

## 2023-01-16 RX ORDER — ONDANSETRON 4 MG/1
4 TABLET, ORALLY DISINTEGRATING ORAL EVERY 6 HOURS PRN
Qty: 15 TABLET | Refills: 0 | Status: SHIPPED | OUTPATIENT
Start: 2023-01-16 | End: 2024-01-23

## 2023-01-16 RX ORDER — MAGNESIUM HYDROXIDE 1200 MG/15ML
LIQUID ORAL PRN
Status: DISCONTINUED | OUTPATIENT
Start: 2023-01-16 | End: 2023-01-16 | Stop reason: HOSPADM

## 2023-01-16 RX ORDER — CEFAZOLIN SODIUM/WATER 2 G/20 ML
2 SYRINGE (ML) INTRAVENOUS SEE ADMIN INSTRUCTIONS
Status: DISCONTINUED | OUTPATIENT
Start: 2023-01-16 | End: 2023-01-16 | Stop reason: HOSPADM

## 2023-01-16 RX ORDER — SODIUM CHLORIDE, SODIUM LACTATE, POTASSIUM CHLORIDE, CALCIUM CHLORIDE 600; 310; 30; 20 MG/100ML; MG/100ML; MG/100ML; MG/100ML
INJECTION, SOLUTION INTRAVENOUS CONTINUOUS PRN
Status: DISCONTINUED | OUTPATIENT
Start: 2023-01-16 | End: 2023-01-16

## 2023-01-16 RX ORDER — FENTANYL CITRATE 0.05 MG/ML
50 INJECTION, SOLUTION INTRAMUSCULAR; INTRAVENOUS EVERY 5 MIN PRN
Status: DISCONTINUED | OUTPATIENT
Start: 2023-01-16 | End: 2023-01-16 | Stop reason: HOSPADM

## 2023-01-16 RX ORDER — NALOXONE HYDROCHLORIDE 0.4 MG/ML
0.2 INJECTION, SOLUTION INTRAMUSCULAR; INTRAVENOUS; SUBCUTANEOUS
Status: DISCONTINUED | OUTPATIENT
Start: 2023-01-16 | End: 2023-01-16 | Stop reason: HOSPADM

## 2023-01-16 RX ORDER — MEPERIDINE HYDROCHLORIDE 25 MG/ML
12.5 INJECTION INTRAMUSCULAR; INTRAVENOUS; SUBCUTANEOUS
Status: DISCONTINUED | OUTPATIENT
Start: 2023-01-16 | End: 2023-01-16 | Stop reason: HOSPADM

## 2023-01-16 RX ORDER — AMOXICILLIN 250 MG
1-2 CAPSULE ORAL 2 TIMES DAILY
Qty: 30 TABLET | Refills: 0 | Status: SHIPPED | OUTPATIENT
Start: 2023-01-16 | End: 2024-01-23

## 2023-01-16 RX ORDER — PROPOFOL 10 MG/ML
INJECTION, EMULSION INTRAVENOUS CONTINUOUS PRN
Status: DISCONTINUED | OUTPATIENT
Start: 2023-01-16 | End: 2023-01-16

## 2023-01-16 RX ORDER — HYDROMORPHONE HCL IN WATER/PF 6 MG/30 ML
0.4 PATIENT CONTROLLED ANALGESIA SYRINGE INTRAVENOUS EVERY 5 MIN PRN
Status: DISCONTINUED | OUTPATIENT
Start: 2023-01-16 | End: 2023-01-16 | Stop reason: HOSPADM

## 2023-01-16 RX ORDER — METHOCARBAMOL 750 MG/1
750 TABLET, FILM COATED ORAL
Status: DISCONTINUED | OUTPATIENT
Start: 2023-01-16 | End: 2023-01-16 | Stop reason: HOSPADM

## 2023-01-16 RX ORDER — HYDROXYZINE HYDROCHLORIDE 10 MG/1
10 TABLET, FILM COATED ORAL
Status: DISCONTINUED | OUTPATIENT
Start: 2023-01-16 | End: 2023-01-16 | Stop reason: HOSPADM

## 2023-01-16 RX ORDER — ONDANSETRON 4 MG/1
4 TABLET, ORALLY DISINTEGRATING ORAL EVERY 30 MIN PRN
Status: DISCONTINUED | OUTPATIENT
Start: 2023-01-16 | End: 2023-01-16 | Stop reason: HOSPADM

## 2023-01-16 RX ORDER — SODIUM CHLORIDE, SODIUM LACTATE, POTASSIUM CHLORIDE, CALCIUM CHLORIDE 600; 310; 30; 20 MG/100ML; MG/100ML; MG/100ML; MG/100ML
INJECTION, SOLUTION INTRAVENOUS CONTINUOUS
Status: DISCONTINUED | OUTPATIENT
Start: 2023-01-16 | End: 2023-01-16 | Stop reason: HOSPADM

## 2023-01-16 RX ORDER — FENTANYL CITRATE 0.05 MG/ML
25 INJECTION, SOLUTION INTRAMUSCULAR; INTRAVENOUS EVERY 5 MIN PRN
Status: DISCONTINUED | OUTPATIENT
Start: 2023-01-16 | End: 2023-01-16 | Stop reason: HOSPADM

## 2023-01-16 RX ORDER — CEFAZOLIN SODIUM 1 G/3ML
INJECTION, POWDER, FOR SOLUTION INTRAMUSCULAR; INTRAVENOUS PRN
Status: DISCONTINUED | OUTPATIENT
Start: 2023-01-16 | End: 2023-01-16

## 2023-01-16 RX ORDER — HYDROMORPHONE HCL IN WATER/PF 6 MG/30 ML
0.2 PATIENT CONTROLLED ANALGESIA SYRINGE INTRAVENOUS EVERY 5 MIN PRN
Status: DISCONTINUED | OUTPATIENT
Start: 2023-01-16 | End: 2023-01-16 | Stop reason: HOSPADM

## 2023-01-16 RX ORDER — DEXAMETHASONE SODIUM PHOSPHATE 4 MG/ML
INJECTION, SOLUTION INTRA-ARTICULAR; INTRALESIONAL; INTRAMUSCULAR; INTRAVENOUS; SOFT TISSUE PRN
Status: DISCONTINUED | OUTPATIENT
Start: 2023-01-16 | End: 2023-01-16

## 2023-01-16 RX ORDER — HYDRALAZINE HYDROCHLORIDE 20 MG/ML
2.5-5 INJECTION INTRAMUSCULAR; INTRAVENOUS EVERY 10 MIN PRN
Status: DISCONTINUED | OUTPATIENT
Start: 2023-01-16 | End: 2023-01-16 | Stop reason: HOSPADM

## 2023-01-16 RX ORDER — CEFAZOLIN SODIUM/WATER 2 G/20 ML
2 SYRINGE (ML) INTRAVENOUS
Status: DISCONTINUED | OUTPATIENT
Start: 2023-01-16 | End: 2023-01-16 | Stop reason: HOSPADM

## 2023-01-16 RX ORDER — NALOXONE HYDROCHLORIDE 0.4 MG/ML
0.4 INJECTION, SOLUTION INTRAMUSCULAR; INTRAVENOUS; SUBCUTANEOUS
Status: DISCONTINUED | OUTPATIENT
Start: 2023-01-16 | End: 2023-01-16 | Stop reason: HOSPADM

## 2023-01-16 RX ORDER — LIDOCAINE HYDROCHLORIDE 20 MG/ML
INJECTION, SOLUTION INFILTRATION; PERINEURAL PRN
Status: DISCONTINUED | OUTPATIENT
Start: 2023-01-16 | End: 2023-01-16

## 2023-01-16 RX ORDER — ROPIVACAINE HYDROCHLORIDE 5 MG/ML
INJECTION, SOLUTION EPIDURAL; INFILTRATION; PERINEURAL
Status: DISCONTINUED
Start: 2023-01-16 | End: 2023-01-16 | Stop reason: HOSPADM

## 2023-01-16 RX ORDER — HYDROCODONE BITARTRATE AND ACETAMINOPHEN 5; 325 MG/1; MG/1
1-2 TABLET ORAL EVERY 4 HOURS PRN
Qty: 30 TABLET | Refills: 0 | Status: SHIPPED | OUTPATIENT
Start: 2023-01-16 | End: 2024-01-23

## 2023-01-16 RX ORDER — HYDROCODONE BITARTRATE AND ACETAMINOPHEN 5; 325 MG/1; MG/1
1 TABLET ORAL
Status: COMPLETED | OUTPATIENT
Start: 2023-01-16 | End: 2023-01-16

## 2023-01-16 RX ORDER — PROPOFOL 10 MG/ML
INJECTION, EMULSION INTRAVENOUS PRN
Status: DISCONTINUED | OUTPATIENT
Start: 2023-01-16 | End: 2023-01-16

## 2023-01-16 RX ORDER — ONDANSETRON 2 MG/ML
INJECTION INTRAMUSCULAR; INTRAVENOUS PRN
Status: DISCONTINUED | OUTPATIENT
Start: 2023-01-16 | End: 2023-01-16

## 2023-01-16 RX ADMIN — PROPOFOL 50 MCG/KG/MIN: 10 INJECTION, EMULSION INTRAVENOUS at 13:05

## 2023-01-16 RX ADMIN — ONDANSETRON 4 MG: 2 INJECTION INTRAMUSCULAR; INTRAVENOUS at 13:13

## 2023-01-16 RX ADMIN — LIDOCAINE HYDROCHLORIDE 100 MG: 20 INJECTION, SOLUTION INFILTRATION; PERINEURAL at 13:04

## 2023-01-16 RX ADMIN — FENTANYL CITRATE 50 MCG: 50 INJECTION, SOLUTION INTRAMUSCULAR; INTRAVENOUS at 13:08

## 2023-01-16 RX ADMIN — SODIUM CHLORIDE, POTASSIUM CHLORIDE, SODIUM LACTATE AND CALCIUM CHLORIDE: 600; 310; 30; 20 INJECTION, SOLUTION INTRAVENOUS at 13:00

## 2023-01-16 RX ADMIN — DEXAMETHASONE SODIUM PHOSPHATE 4 MG: 4 INJECTION, SOLUTION INTRA-ARTICULAR; INTRALESIONAL; INTRAMUSCULAR; INTRAVENOUS; SOFT TISSUE at 13:13

## 2023-01-16 RX ADMIN — FENTANYL CITRATE 25 MCG: 50 INJECTION, SOLUTION INTRAMUSCULAR; INTRAVENOUS at 14:01

## 2023-01-16 RX ADMIN — HYDROCODONE BITARTRATE AND ACETAMINOPHEN 1 TABLET: 5; 325 TABLET ORAL at 14:47

## 2023-01-16 RX ADMIN — PROPOFOL 100 MG: 10 INJECTION, EMULSION INTRAVENOUS at 13:08

## 2023-01-16 RX ADMIN — CEFAZOLIN 2 G: 1 INJECTION, POWDER, FOR SOLUTION INTRAMUSCULAR; INTRAVENOUS at 13:00

## 2023-01-16 RX ADMIN — FENTANYL CITRATE 50 MCG: 50 INJECTION, SOLUTION INTRAMUSCULAR; INTRAVENOUS at 13:04

## 2023-01-16 RX ADMIN — PHENYLEPHRINE HYDROCHLORIDE 50 MCG: 10 INJECTION INTRAVENOUS at 13:22

## 2023-01-16 RX ADMIN — PROPOFOL 200 MG: 10 INJECTION, EMULSION INTRAVENOUS at 13:04

## 2023-01-16 RX ADMIN — PHENYLEPHRINE HYDROCHLORIDE 50 MCG: 10 INJECTION INTRAVENOUS at 13:30

## 2023-01-16 RX ADMIN — PHENYLEPHRINE HYDROCHLORIDE 100 MCG: 10 INJECTION INTRAVENOUS at 13:33

## 2023-01-16 RX ADMIN — PHENYLEPHRINE HYDROCHLORIDE 100 MCG: 10 INJECTION INTRAVENOUS at 13:24

## 2023-01-16 ASSESSMENT — ACTIVITIES OF DAILY LIVING (ADL)
ADLS_ACUITY_SCORE: 35
ADLS_ACUITY_SCORE: 35

## 2023-01-16 ASSESSMENT — LIFESTYLE VARIABLES: TOBACCO_USE: 0

## 2023-01-16 NOTE — ANESTHESIA PROCEDURE NOTES
Airway       Patient location during procedure: OR  Staff -        Anesthesiologist:  David Fuchs MD       CRNA: Angeles Campos APRN CRNA       Other Anesthesia Staff: Maddi Link       Performed By: SRNA  Consent for Airway        Urgency: elective  Indications and Patient Condition       Indications for airway management: nabeel-procedural       Induction type:intravenous       Mask difficulty assessment: 0 - not attempted    Final Airway Details       Final airway type: supraglottic airway    Supraglottic Airway Details        Type: LMA       Brand: Ambu AuraGain       LMA size: 4       Cuff Pressure (cm H2O): 20    Post intubation assessment        Placement verified by: capnometry, equal breath sounds and chest rise        Number of attempts at approach: 1       Secured with: commercial tube salgado and pink tape       Ease of procedure: easy       Dentition: Intact and Unchanged

## 2023-01-16 NOTE — OP NOTE
Procedure Date: 01/16/2023    PREOPERATIVE DIAGNOSES:     1.  Failure of a previous left bunion repair done elsewhere.  2.  Retained hardware, first tarsometatarsal joint, left foot.    POSTOPERATIVE DIAGNOSES:    1.  Failure of a previous left bunion repair done elsewhere.  2.  Retained hardware, first tarsometatarsal joint, left foot.    SURGICAL PROCEDURE:    1.  Revision left bunion repair with boat metatarsal osteotomy as well as an Akin osteotomy of the proximal phalanx.   2.  Removal of a plate and screws from the left first tarsometatarsal joint.    ANESTHETIC:  General.    SURGEON:  Torey Kamara M.D.     ASSISTANT:  Elio Brown PA-C    NEED FOR AN ASSISTANT:  A skilled first assistant was necessary through all portions of the case in order to assist with positioning, retraction, wound closure, dressing and splint application.    PREAMBLE:  Ms. Goodell had Lapidus bunion repair done in the past at an outside facility.  She unfortunately has a significant recurrence of her metatarsus primus varus, hallux valgus and also a very significant hallux rigidus with only 15 degrees dorsiflexion of her great toe MTP joint.    Informed consent was obtained for above-mentioned procedure.    DESCRIPTION OF PROCEDURE:  After adequate induction of a general anesthetic, the patient was positioned supine on the operating table.  The left leg was sterilely prepped and free draped in the usual fashion.  Tourniquet around the thigh was inflated to 300 mmHg.    The previous dorsal medial incision was used over the first tarsometatarsal joint.  The plate and 4 screws were removed.  There was an interfragmentary screw that was completely covered by bone and that was not retrieved.  This was followed by a 5 cm incision along the medial border of the MTP joint.  The capsule was incised longitudinally.  The medial eminence was removed with a saw.  This was followed by a 90-degree metatarsal osteotomy with a long plantar limb.   The capital fragment was translated laterally by 5 mm and immobilized with 2 small screws.  A separate incision was made in the first webspace to do an adductor release.  This was then followed by a 20-degree medially based and 25-degree dorsally based combination Akin/Grabiel osteotomy of the proximal phalanx to correct the hallux interphalangeus and also increase dorsiflexion of the toe.  The osteotomy was secured with a suture with excellent alignment.    The medial capsule was repaired.  The wound was closed in layers.  A sterile dressing and a light compressive bandage applied followed by a bunion splint.  She tolerated the procedure well and was taken to the recovery room in satisfactory condition.    She can ambulate weightbearing as tolerated.  Sutures will be removed in 2 weeks.     Torey Kamara MD        D: 2023   T: 2023   MT: JACKSON    Name:     GOODELL, MERRILY A.  MRN:      [id number]        Account:        [id number]   :      1950           Procedure Date: 2023     Document: [id number]

## 2023-01-16 NOTE — ANESTHESIA POSTPROCEDURE EVALUATION
Patient: Merrily Ann Goodell    Procedure: Procedure(s):  LEFT REVISION BUNION REPAIR AND  REMOVAL PLATE AND SCREWS       Anesthesia Type:  General    Note:  Disposition: Outpatient   Postop Pain Control: Uneventful            Sign Out: Well controlled pain   PONV: No   Neuro/Psych: Uneventful            Sign Out: Acceptable/Baseline neuro status   Airway/Respiratory: Uneventful            Sign Out: Acceptable/Baseline resp. status   CV/Hemodynamics: Uneventful            Sign Out: Acceptable CV status; No obvious hypovolemia; No obvious fluid overload   Other NRE: NONE   DID A NON-ROUTINE EVENT OCCUR? No           Last vitals:  Vitals Value Taken Time   /73 01/16/23 1345   Temp 36.1  C (96.9  F) 01/16/23 1345   Pulse 61 01/16/23 1356   Resp 46 01/16/23 1356   SpO2 99 % 01/16/23 1356   Vitals shown include unvalidated device data.    Electronically Signed By: David Fuchs MD  January 16, 2023  1:57 PM

## 2023-01-16 NOTE — DISCHARGE INSTRUCTIONS
Same Day Surgery Discharge Instructions for  Sedation and General Anesthesia     It's not unusual to feel dizzy, light-headed or faint for up to 24 hours after surgery or while taking pain medication.  If you have these symptoms: sit for a few minutes before standing and have someone assist you when you get up to walk or use the bathroom.    You should rest and relax for the next 24 hours. We recommend you make arrangements to have an adult stay with you for at least 24 hours after your discharge.  Avoid hazardous and strenuous activity.    DO NOT DRIVE any vehicle or operate mechanical equipment for 24 hours following the end of your surgery.  Even though you may feel normal, your reactions may be affected by the medication you have received.    Do not drink alcoholic beverages for 24 hours following surgery.     Slowly progress to your regular diet as you feel able. It's not unusual to feel nauseated and/or vomit after receiving anesthesia.  If you develop these symptoms, drink clear liquids (apple juice, ginger ale, broth, 7-up, etc. ) until you feel better.  If your nausea and vomiting persists for 24 hours, please notify your surgeon.      All narcotic pain medications, along with inactivity and anesthesia, can cause constipation. Drinking plenty of liquids and increasing fiber intake will help.    For any questions of a medical nature, call your surgeon.    Do not make important decisions for 24 hours.    If you had general anesthesia, you may have a sore throat for a couple of days related to the breathing tube used during surgery.  You may use Cepacol lozenges to help with this discomfort.  If it worsens or if you develop a fever, contact your surgeon.     If you feel your pain is not well managed with the pain medications prescribed by your surgeon, please contact your surgeon's office to let them know so they can address your concerns.           Crutch Instructions    Because of your surgery you will  "need crutches.  Make sure you tell your healthcare provider if crutches do not seem to work for you.  Using Crutches   Crutches are the most commonly used device for injuries to the lower part of the body because they allow the most mobility. All walking assistance devices require strength in your upper body but crutches require more coordination. If you are unable to use crutches then a cane or walker may be better.   Remember these rules when using crutches:   Always look straight ahead when you walk. Do not look down.   Hold the top padded part of the crutches into your chest near your armpits. Grasp the padded hand  and always support your weight with your arms and hands.   Do not lean on the crutches with your armpit as this could cause nerve damage.  Standing Up  Make sure you are in a stable chair. Move forward to the edge of the chair so that your  good  foot is flat on the floor. Put both crutches together and hold the handgrips in one hand. Stretch the injured leg out straight and then use the crutches with one hand and the chair armrest with the other hand to push yourself into a standing position onto your  good  leg. Once you are standing, wait until you are steady before placing a crutch in each hand. Until you become good at standing, have someone assist you in case you lose your balance. When standing still, lean slightly forward with the crutches ahead of you and about 3 feet apart. Unless you are told otherwise, you should keep weight off your injured leg.  Walking  To begin crutch walking, balance yourself on your  good  leg. Move both crutches forward about the length of one step and place them firmly on the floor in front of you about 3 feet apart. Support your weight on the padded hand rests while leaning forward. Press the top of the crutches against your chest wall and not into your armpits. Be sure to hold the injured leg up off of the floor. When ready, swing the \"good\" leg forward " "about one step length past the crutches while supporting your weight on the padded hand . Be careful not to go too far. Transfer your weight onto the \"good\" leg then bring both crutches forward about the length of one step. Repeating this motion will allow you to move fairly quickly without the use of your injured leg. Keep practicing until you become good at crutch walking.  Sitting Down  Make sure the chair you are about to sit on is stable. Walk in front of the chair such that you are facing away from it. Move back a little at a time until the back of your  good  leg is nearly touching the seat. Keeping your weight on the  good  leg, move both crutches to one hand holding onto the handgrips. Lean forward, bend your  good  knee, and move your injured leg out forward. Sit down slowly while using your free hand to reach for the chair s armrest for support. Place the crutches where you can easily get them. Never pivot, even on your  good  leg. This could cause you to fall or injure your  good  leg.  Navigating Stairs, Curbs & Door Steps  Only attempt this once you are very comfortable with regular crutch walking and only when someone is there to steady you should you begin to lose your balance. Hold on to a  railing with one hand and both crutches in the other hand or have someone carry the loose crutch for you. It does not matter which side the handrail is on. If there is no handrail, you can use both crutches. Using only crutches is the same as the railing method but maintaining your balance can be difficult. The crutch-only method is not recommended until you have become very good at crutch walking. Be sure to only take one step at a time. A simple rule to remember for crutches when navigating stairs or curbs: up with the  good  leg and down with the  bad .  Going Up Stairs or Curbs  Walk close to the first step with the crutches slightly behind you. Push down on the handrail and the crutch and " "step up with the \"good\" leg. You may have to make a short hop to do this if you are not allowed to place any weight on the injured leg. Lean forward and bring the injured leg and the crutch up beside the \"good\" leg. Repeat this until you have climbed up all of the steps. Remember, the \"good\" leg goes up first and the crutches move with the injured leg. The person helping you should stand behind and to your side that is away from the railing.  Going Down Stairs or Curbs  Walk to the edge of the first step. While standing and balancing on the  good  leg, place both crutches in one hand and then down on the step below. Be sure to support your weight by leaning on the crutches and handrail. Bring the injured leg forward, then the \"good\" leg down to the same step as the crutches. Remember crutches go down first with the injured leg. The person helping you should  front and to your side that is away from the railing.  Sitting Method for Navigating Stairs  A safer way to get up and down stairs is to sit down. To go up steps, sit down on the second or third step. Push with your  good  leg below while pushing up with both arms on the step above to move your bottom to the next step. When you get to the top of the stairs you may need to use the  scooting  method described later to get back up. To go down steps you first need to lower yourself to the floor near the top of the steps. Once seated, support yourself with your  good  leg on the second to next step below, and push with both arms on the step where you are sitting to move your bottom down to the next step. When you reach the bottom, pull yourself up to standing position using your crutches. It is helpful if someone can move your crutches and assist you in getting up and down. With practice it may be possible to manage on your own.  If You Start To Fall  If you start to fall and cannot get your balance, throw the crutches away from you. Try to fall onto your " " good  side away from your injury and then break your fall with your arms. If uninjured, you can usually get back up by moving into a sitting position and scooting to a chair. Push with your arms and hands on the chair seat while pushing with the \"good\" leg to get up into the chair. You should not attempt to get back up if you are having significant pain. Call for assistance or dial 911 for an ambulance if necessary.  Safety Tips for Crutch Walking   At first you may want to wear a training belt (or a strong regular belt) so others can assist you.   Do not use crutches if you feel dizzy or drowsy.   Be careful on slick or wet surfaces like a kitchen or bathroom floor, snow, ice, or rainy conditions.   Temporarily remove pets, throw rugs or other items from your home that might trip you.   Do not hop around while holding onto furniture.   Wear sneakers or rubber soled shoes that will not easily slip or come off.   Be careful on ramps or slopes as they can be harder to walk up or down   Do not remove any parts from your crutches especially the padding or rubber traction footings. Replace padding or footings that become damaged immediately.   It is important to use your crutches correctly. If you feel any numbness or tingling in your arms, you are probably using the crutches incorrectly.  Helpful Hints   A bedside toilet or toilet riser may be helpful.   Always allow for extra time to get around. Children in school should be given permission to leave class early to avoid crowds when changing classes.   Elevate the injured leg when sitting.   Use a backpack to carry books or waist pouch to carry other items so that both hands are free.   Call your healthcare provider if you have any questions or concerns.                 ** If you have questions or concerns about your procedure,   call Dr. Kamara at [phone] **        "

## 2023-01-16 NOTE — ANESTHESIA PREPROCEDURE EVALUATION
Anesthesia Pre-Procedure Evaluation    Patient: Merrily Ann Goodell   MRN: [id number] : 1950        Procedure : Procedure(s):  LEFT REVISION BUNION REPAIR AND  REMOVAL PLATE AND SCREWS          Past Medical History:   Diagnosis Date     Aortic stenosis      Heart murmur      Hypertension       Past Surgical History:   Procedure Laterality Date     AORTIC VALVE REPLACEMENT       ELBOW SURGERY       FOOT SURGERY       HYSTERECTOMY       OOPHORECTOMY Left      OOPHORECTOMY Right      TONSILLECTOMY        Allergies   Allergen Reactions     Shellfish-Derived Products Anaphylaxis      Social History     Tobacco Use     Smoking status: Never     Smokeless tobacco: Never   Substance Use Topics     Alcohol use: Yes      Wt Readings from Last 1 Encounters:   20 108.4 kg (239 lb)        Anesthesia Evaluation   Pt has had prior anesthetic.     No history of anesthetic complications       ROS/MED HX  ENT/Pulmonary:  - neg pulmonary ROS  (-) tobacco use and sleep apnea   Neurologic:  - neg neurologic ROS     Cardiovascular:     (+) hypertension-----valvular problems/murmurs type: AS s/p AVR.     METS/Exercise Tolerance:     Hematologic:       Musculoskeletal:       GI/Hepatic:    (-) GERD   Renal/Genitourinary:  - neg Renal ROS     Endo:  - neg endo ROS  (-) Type II DM   Psychiatric/Substance Use:       Infectious Disease:       Malignancy:       Other:            Physical Exam    Airway        Mallampati: II   TM distance: > 3 FB   Neck ROM: full   Mouth opening: > 3 cm    Respiratory Devices and Support         Dental       (+) implants      Cardiovascular   cardiovascular exam normal          Pulmonary   pulmonary exam normal                OUTSIDE LABS:  CBC:   Lab Results   Component Value Date    HGB 14.5 2018    HGB 14.3 2015     BMP: No results found for: NA, POTASSIUM, CHLORIDE, CO2, BUN, CR, GLC  COAGS: No results found for: PTT, INR, FIBR  POC: No results found for: BGM, HCG, HCGS  HEPATIC: No  results found for: ALBUMIN, PROTTOTAL, ALT, AST, GGT, ALKPHOS, BILITOTAL, BILIDIRECT, SOREN  OTHER:   Lab Results   Component Value Date    DEEPA 8.8 06/24/2015    TSH 2.61 06/24/2015       Anesthesia Plan    ASA Status:  3   NPO Status:  NPO Appropriate    Anesthesia Type: General.     - Airway: LMA   Induction: Intravenous, Propofol.   Maintenance: Balanced.        Consents    Anesthesia Plan(s) and associated risks, benefits, and realistic alternatives discussed. Questions answered and patient/representative(s) expressed understanding.    - Discussed:     - Discussed with:  Patient         Postoperative Care    Pain management: IV analgesics.   PONV prophylaxis: Ondansetron (or other 5HT-3), Background Propofol Infusion     Comments:                David Fuchs MD

## 2023-01-16 NOTE — ANESTHESIA CARE TRANSFER NOTE
Patient: Merrily Ann Goodell    Procedure: Procedure(s):  LEFT REVISION BUNION REPAIR AND  REMOVAL PLATE AND SCREWS       Diagnosis: Bunion [M21.619]  Diagnosis Additional Information: No value filed.    Anesthesia Type:   General     Note:    Oropharynx: oropharynx clear of all foreign objects  Level of Consciousness: drowsy  Oxygen Supplementation: face mask  Level of Supplemental Oxygen (L/min / FiO2): 10  Independent Airway: airway patency satisfactory and stable  Dentition: dentition unchanged  Vital Signs Stable: post-procedure vital signs reviewed and stable  Report to RN Given: handoff report given  Patient transferred to: PACU  Comments: Pt exhibits spontaneous respirations, follows commands, suctioned, LMA removed, exchanging well, transferred to pacu with O2 @ 10L via mask, all monitors and alarms on, report to RN, VSS.  Handoff Report: Identifed the Patient, Identified the Reponsible Provider, Reviewed the pertinent medical history, Discussed the surgical course, Reviewed Intra-OP anesthesia mangement and issues during anesthesia, Set expectations for post-procedure period and Allowed opportunity for questions and acknowledgement of understanding      Vitals:  Vitals Value Taken Time   BP     Temp     Pulse     Resp     SpO2         Electronically Signed By: MEENU Ro CRNA  January 16, 2023  1:45 PM

## 2023-01-16 NOTE — OR NURSING
Up to BR voids light pale ellow X1- AOX3-VSS-O2 sats >92% RA- Good PO intake, pain tolerable 2/10 - Pt and responsible adult verbalize understanding of discharge instructions, denies questions. Up in W/C - transported to door for discharge to home.

## 2023-01-16 NOTE — INTERVAL H&P NOTE
I have reviewed the surgical (or preoperative) H&P that is linked to this encounter, and examined the patient. There are no significant changes    Clinical Conditions Present on Arrival:  Clinically Significant Risk Factors Present on Admission                         
I have reviewed and confirmed nurses' notes for patient's medications, allergies, medical history, and surgical history.

## 2023-01-31 ENCOUNTER — TRANSFERRED RECORDS (OUTPATIENT)
Dept: HEALTH INFORMATION MANAGEMENT | Facility: CLINIC | Age: 73
End: 2023-01-31

## 2023-02-23 ENCOUNTER — TRANSFERRED RECORDS (OUTPATIENT)
Dept: HEALTH INFORMATION MANAGEMENT | Facility: CLINIC | Age: 73
End: 2023-02-23

## 2023-04-02 ENCOUNTER — HEALTH MAINTENANCE LETTER (OUTPATIENT)
Age: 73
End: 2023-04-02

## 2023-04-18 ENCOUNTER — TRANSFERRED RECORDS (OUTPATIENT)
Dept: HEALTH INFORMATION MANAGEMENT | Facility: CLINIC | Age: 73
End: 2023-04-18
Payer: COMMERCIAL

## 2023-05-20 ENCOUNTER — ANESTHESIA EVENT (OUTPATIENT)
Dept: SURGERY | Facility: CLINIC | Age: 73
End: 2023-05-20
Payer: COMMERCIAL

## 2023-05-22 ENCOUNTER — HOSPITAL ENCOUNTER (OUTPATIENT)
Facility: CLINIC | Age: 73
Discharge: HOME | End: 2023-05-22
Attending: ORTHOPAEDIC SURGERY | Admitting: ORTHOPAEDIC SURGERY
Payer: COMMERCIAL

## 2023-05-22 ENCOUNTER — ANESTHESIA (OUTPATIENT)
Dept: SURGERY | Facility: CLINIC | Age: 73
End: 2023-05-22
Payer: COMMERCIAL

## 2023-05-22 ENCOUNTER — APPOINTMENT (OUTPATIENT)
Dept: GENERAL RADIOLOGY | Facility: CLINIC | Age: 73
End: 2023-05-22
Attending: ORTHOPAEDIC SURGERY
Payer: COMMERCIAL

## 2023-05-22 VITALS
BODY MASS INDEX: 39.37 KG/M2 | RESPIRATION RATE: 18 BRPM | HEIGHT: 69 IN | OXYGEN SATURATION: 96 % | WEIGHT: 265.8 LBS | DIASTOLIC BLOOD PRESSURE: 67 MMHG | HEART RATE: 63 BPM | SYSTOLIC BLOOD PRESSURE: 131 MMHG | TEMPERATURE: 98.5 F

## 2023-05-22 DIAGNOSIS — Z98.890 S/P FOOT SURGERY: Primary | ICD-10-CM

## 2023-05-22 PROCEDURE — 250N000011 HC RX IP 250 OP 636: Performed by: ANESTHESIOLOGY

## 2023-05-22 PROCEDURE — 710N000012 HC RECOVERY PHASE 2, PER MINUTE: Performed by: ORTHOPAEDIC SURGERY

## 2023-05-22 PROCEDURE — C1713 ANCHOR/SCREW BN/BN,TIS/BN: HCPCS | Performed by: ORTHOPAEDIC SURGERY

## 2023-05-22 PROCEDURE — 250N000013 HC RX MED GY IP 250 OP 250 PS 637: Performed by: PHYSICIAN ASSISTANT

## 2023-05-22 PROCEDURE — 250N000025 HC SEVOFLURANE, PER MIN: Performed by: ORTHOPAEDIC SURGERY

## 2023-05-22 PROCEDURE — 250N000011 HC RX IP 250 OP 636: Performed by: ORTHOPAEDIC SURGERY

## 2023-05-22 PROCEDURE — 250N000011 HC RX IP 250 OP 636: Performed by: PHYSICIAN ASSISTANT

## 2023-05-22 PROCEDURE — 360N000076 HC SURGERY LEVEL 3, PER MIN: Performed by: ORTHOPAEDIC SURGERY

## 2023-05-22 PROCEDURE — 250N000009 HC RX 250: Performed by: ANESTHESIOLOGY

## 2023-05-22 PROCEDURE — 258N000003 HC RX IP 258 OP 636: Performed by: ANESTHESIOLOGY

## 2023-05-22 PROCEDURE — 710N000009 HC RECOVERY PHASE 1, LEVEL 1, PER MIN: Performed by: ORTHOPAEDIC SURGERY

## 2023-05-22 PROCEDURE — 370N000017 HC ANESTHESIA TECHNICAL FEE, PER MIN: Performed by: ORTHOPAEDIC SURGERY

## 2023-05-22 PROCEDURE — 999N000179 XR SURGERY CARM FLUORO LESS THAN 5 MIN W STILLS

## 2023-05-22 PROCEDURE — 999N000141 HC STATISTIC PRE-PROCEDURE NURSING ASSESSMENT: Performed by: ORTHOPAEDIC SURGERY

## 2023-05-22 PROCEDURE — 272N000001 HC OR GENERAL SUPPLY STERILE: Performed by: ORTHOPAEDIC SURGERY

## 2023-05-22 PROCEDURE — 271N000001 HC OR GENERAL SUPPLY NON-STERILE: Performed by: ORTHOPAEDIC SURGERY

## 2023-05-22 DEVICE — IMP SCR SYN CORTEX 2.0X16MM SELF TAP SS 201.816: Type: IMPLANTABLE DEVICE | Site: FOOT | Status: FUNCTIONAL

## 2023-05-22 DEVICE — IMP SCR SYN CORTEX 1.5X16MM SELF TAP SS 200.816: Type: IMPLANTABLE DEVICE | Site: FOOT | Status: FUNCTIONAL

## 2023-05-22 RX ORDER — ONDANSETRON 4 MG/1
4 TABLET, ORALLY DISINTEGRATING ORAL
Status: DISCONTINUED | OUTPATIENT
Start: 2023-05-22 | End: 2023-05-22 | Stop reason: HOSPADM

## 2023-05-22 RX ORDER — METHOCARBAMOL 750 MG/1
750 TABLET, FILM COATED ORAL
Status: DISCONTINUED | OUTPATIENT
Start: 2023-05-22 | End: 2023-05-22 | Stop reason: HOSPADM

## 2023-05-22 RX ORDER — SODIUM CHLORIDE, SODIUM LACTATE, POTASSIUM CHLORIDE, CALCIUM CHLORIDE 600; 310; 30; 20 MG/100ML; MG/100ML; MG/100ML; MG/100ML
INJECTION, SOLUTION INTRAVENOUS CONTINUOUS
Status: DISCONTINUED | OUTPATIENT
Start: 2023-05-22 | End: 2023-05-22 | Stop reason: HOSPADM

## 2023-05-22 RX ORDER — SODIUM CHLORIDE, SODIUM LACTATE, POTASSIUM CHLORIDE, CALCIUM CHLORIDE 600; 310; 30; 20 MG/100ML; MG/100ML; MG/100ML; MG/100ML
INJECTION, SOLUTION INTRAVENOUS CONTINUOUS PRN
Status: DISCONTINUED | OUTPATIENT
Start: 2023-05-22 | End: 2023-05-22

## 2023-05-22 RX ORDER — LIDOCAINE HYDROCHLORIDE 20 MG/ML
INJECTION, SOLUTION INFILTRATION; PERINEURAL PRN
Status: DISCONTINUED | OUTPATIENT
Start: 2023-05-22 | End: 2023-05-22

## 2023-05-22 RX ORDER — ROPIVACAINE HYDROCHLORIDE 5 MG/ML
INJECTION, SOLUTION EPIDURAL; INFILTRATION; PERINEURAL
Status: DISCONTINUED
Start: 2023-05-22 | End: 2023-05-22 | Stop reason: HOSPADM

## 2023-05-22 RX ORDER — ROPIVACAINE HYDROCHLORIDE 5 MG/ML
INJECTION, SOLUTION EPIDURAL; INFILTRATION; PERINEURAL PRN
Status: DISCONTINUED | OUTPATIENT
Start: 2023-05-22 | End: 2023-05-22 | Stop reason: HOSPADM

## 2023-05-22 RX ORDER — HYDROCODONE BITARTRATE AND ACETAMINOPHEN 5; 325 MG/1; MG/1
1 TABLET ORAL
Status: COMPLETED | OUTPATIENT
Start: 2023-05-22 | End: 2023-05-22

## 2023-05-22 RX ORDER — ONDANSETRON 4 MG/1
4 TABLET, ORALLY DISINTEGRATING ORAL EVERY 6 HOURS PRN
Qty: 15 TABLET | Refills: 1 | Status: SHIPPED | OUTPATIENT
Start: 2023-05-22 | End: 2024-01-23

## 2023-05-22 RX ORDER — HYDROCODONE BITARTRATE AND ACETAMINOPHEN 5; 325 MG/1; MG/1
1-2 TABLET ORAL EVERY 4 HOURS PRN
Qty: 30 TABLET | Refills: 0 | Status: SHIPPED | OUTPATIENT
Start: 2023-05-22 | End: 2024-01-23

## 2023-05-22 RX ORDER — HYDROXYZINE HYDROCHLORIDE 10 MG/1
10 TABLET, FILM COATED ORAL
Status: DISCONTINUED | OUTPATIENT
Start: 2023-05-22 | End: 2023-05-22 | Stop reason: HOSPADM

## 2023-05-22 RX ORDER — EPHEDRINE SULFATE 50 MG/ML
INJECTION, SOLUTION INTRAMUSCULAR; INTRAVENOUS; SUBCUTANEOUS PRN
Status: DISCONTINUED | OUTPATIENT
Start: 2023-05-22 | End: 2023-05-22

## 2023-05-22 RX ORDER — CEFAZOLIN SODIUM/WATER 2 G/20 ML
2 SYRINGE (ML) INTRAVENOUS
Status: DISCONTINUED | OUTPATIENT
Start: 2023-05-22 | End: 2023-05-22 | Stop reason: HOSPADM

## 2023-05-22 RX ORDER — CELECOXIB 200 MG/1
200 CAPSULE ORAL
Status: DISCONTINUED | OUTPATIENT
Start: 2023-05-22 | End: 2023-05-22 | Stop reason: HOSPADM

## 2023-05-22 RX ORDER — FENTANYL CITRATE 50 UG/ML
INJECTION, SOLUTION INTRAMUSCULAR; INTRAVENOUS PRN
Status: DISCONTINUED | OUTPATIENT
Start: 2023-05-22 | End: 2023-05-22

## 2023-05-22 RX ORDER — AMOXICILLIN 250 MG
1-2 CAPSULE ORAL 2 TIMES DAILY
Qty: 30 TABLET | Refills: 0 | Status: SHIPPED | OUTPATIENT
Start: 2023-05-22 | End: 2024-01-23

## 2023-05-22 RX ORDER — DEXAMETHASONE SODIUM PHOSPHATE 4 MG/ML
INJECTION, SOLUTION INTRA-ARTICULAR; INTRALESIONAL; INTRAMUSCULAR; INTRAVENOUS; SOFT TISSUE PRN
Status: DISCONTINUED | OUTPATIENT
Start: 2023-05-22 | End: 2023-05-22

## 2023-05-22 RX ORDER — PROPOFOL 10 MG/ML
INJECTION, EMULSION INTRAVENOUS PRN
Status: DISCONTINUED | OUTPATIENT
Start: 2023-05-22 | End: 2023-05-22

## 2023-05-22 RX ORDER — LIDOCAINE 40 MG/G
CREAM TOPICAL
Status: DISCONTINUED | OUTPATIENT
Start: 2023-05-22 | End: 2023-05-22 | Stop reason: HOSPADM

## 2023-05-22 RX ORDER — ONDANSETRON 2 MG/ML
INJECTION INTRAMUSCULAR; INTRAVENOUS PRN
Status: DISCONTINUED | OUTPATIENT
Start: 2023-05-22 | End: 2023-05-22

## 2023-05-22 RX ORDER — CEFAZOLIN SODIUM/WATER 2 G/20 ML
2 SYRINGE (ML) INTRAVENOUS SEE ADMIN INSTRUCTIONS
Status: DISCONTINUED | OUTPATIENT
Start: 2023-05-22 | End: 2023-05-22 | Stop reason: HOSPADM

## 2023-05-22 RX ADMIN — Medication 3 G: at 08:03

## 2023-05-22 RX ADMIN — FENTANYL CITRATE 50 MCG: 50 INJECTION, SOLUTION INTRAMUSCULAR; INTRAVENOUS at 08:17

## 2023-05-22 RX ADMIN — ONDANSETRON 4 MG: 2 INJECTION INTRAMUSCULAR; INTRAVENOUS at 08:32

## 2023-05-22 RX ADMIN — SODIUM CHLORIDE, POTASSIUM CHLORIDE, SODIUM LACTATE AND CALCIUM CHLORIDE: 600; 310; 30; 20 INJECTION, SOLUTION INTRAVENOUS at 08:03

## 2023-05-22 RX ADMIN — MIDAZOLAM 2 MG: 1 INJECTION INTRAMUSCULAR; INTRAVENOUS at 08:03

## 2023-05-22 RX ADMIN — FENTANYL CITRATE 50 MCG: 50 INJECTION, SOLUTION INTRAMUSCULAR; INTRAVENOUS at 08:08

## 2023-05-22 RX ADMIN — HYDROCODONE BITARTRATE AND ACETAMINOPHEN 1 TABLET: 5; 325 TABLET ORAL at 09:17

## 2023-05-22 RX ADMIN — DEXAMETHASONE SODIUM PHOSPHATE 4 MG: 4 INJECTION, SOLUTION INTRA-ARTICULAR; INTRALESIONAL; INTRAMUSCULAR; INTRAVENOUS; SOFT TISSUE at 08:11

## 2023-05-22 RX ADMIN — LIDOCAINE HYDROCHLORIDE 100 MG: 20 INJECTION, SOLUTION INFILTRATION; PERINEURAL at 08:08

## 2023-05-22 RX ADMIN — SODIUM CHLORIDE, POTASSIUM CHLORIDE, SODIUM LACTATE AND CALCIUM CHLORIDE: 600; 310; 30; 20 INJECTION, SOLUTION INTRAVENOUS at 07:18

## 2023-05-22 RX ADMIN — PROPOFOL 170 MG: 10 INJECTION, EMULSION INTRAVENOUS at 08:08

## 2023-05-22 RX ADMIN — PHENYLEPHRINE HYDROCHLORIDE 50 MCG: 10 INJECTION INTRAVENOUS at 08:27

## 2023-05-22 RX ADMIN — Medication 5 MG: at 08:31

## 2023-05-22 RX ADMIN — PROPOFOL 30 MG: 10 INJECTION, EMULSION INTRAVENOUS at 08:15

## 2023-05-22 ASSESSMENT — ACTIVITIES OF DAILY LIVING (ADL)
ADLS_ACUITY_SCORE: 35
ADLS_ACUITY_SCORE: 35

## 2023-05-22 ASSESSMENT — ENCOUNTER SYMPTOMS: SEIZURES: 0

## 2023-05-22 ASSESSMENT — LIFESTYLE VARIABLES: TOBACCO_USE: 0

## 2023-05-22 NOTE — ANESTHESIA PROCEDURE NOTES
Airway       Patient location during procedure: OR       Procedure Start/Stop Times: 5/22/2023 8:10 AM  Staff -        CRNA: Reinta Yeager APRN CRNA       Performed By: CRNA  Consent for Airway        Urgency: elective  Indications and Patient Condition       Indications for airway management: nabeel-procedural       Induction type:intravenous       Mask difficulty assessment: 0 - not attempted    Final Airway Details       Final airway type: supraglottic airway    Supraglottic Airway Details        Type: LMA       Brand: I-Gel       LMA size: 4    Post intubation assessment        Placement verified by: capnometry, equal breath sounds and chest rise        Number of attempts at approach: 1       Number of other approaches attempted: 0       Secured with: pink tape       Ease of procedure: easy       Dentition: Intact and Unchanged    Medication(s) Administered   Medication Administration Time: 5/22/2023 8:10 AM

## 2023-05-22 NOTE — INTERVAL H&P NOTE
"I have reviewed the surgical (or preoperative) H&P that is linked to this encounter, and examined the patient. There are no significant changes    Clinical Conditions Present on Arrival:  Clinically Significant Risk Factors Present on Admission                 # Drug Induced Platelet Defect: home medication list includes an antiplatelet medication  # Obesity: Estimated body mass index is 39.25 kg/m  as calculated from the following:    Height as of this encounter: 1.753 m (5' 9\").    Weight as of this encounter: 120.6 kg (265 lb 12.8 oz).       "

## 2023-05-22 NOTE — ANESTHESIA PREPROCEDURE EVALUATION
Anesthesia Pre-Procedure Evaluation    Patient: Merrily A Goodell   MRN: [id number] : 1950        Procedure : Procedure(s):  RIGHT FOOT BUNION REPAIR          Past Medical History:   Diagnosis Date     Aortic stenosis      Arm dysfunction      Closed displaced fracture of coronoid process of right ulna      Coronary artery disease      Fall from ladder      Fracture of distal end of right radius and ulna      Heart murmur      Hypertension      Patella fracture       Past Surgical History:   Procedure Laterality Date     AORTIC VALVE REPLACEMENT       BUNIONECTOMY Left 2023    Procedure: LEFT REVISION BUNION REPAIR AND;  Surgeon: Torey Kamara MD;  Location: [facility] OR     ELBOW SURGERY       FOOT SURGERY       GYN SURGERY      partial hysterectomy     HYSTERECTOMY       OOPHORECTOMY Left      OOPHORECTOMY Right      ORTHOPEDIC SURGERY      hand surgery, knee surgery, wrist surgery     REMOVE HARDWARE FOOT Left 2023    Procedure: REMOVAL PLATE AND SCREWS;  Surgeon: Torey Kamara MD;  Location:  OR     TONSILLECTOMY        Allergies   Allergen Reactions     Shellfish-Derived Products Anaphylaxis     Shrimp Flavor Anaphylaxis     Lisinopril Itching and Cough      Social History     Tobacco Use     Smoking status: Never     Smokeless tobacco: Never   Vaping Use     Vaping status: Not on file   Substance Use Topics     Alcohol use: Yes     Comment: rarely      Wt Readings from Last 1 Encounters:   23 120.6 kg (265 lb 12.8 oz)        Anesthesia Evaluation   Pt has had prior anesthetic.     No history of anesthetic complications       ROS/MED HX  ENT/Pulmonary:    (-) tobacco use and sleep apnea   Neurologic:    (-) no seizures and no CVA   Cardiovascular:     (+) hypertension--CAD ---valvular problems/murmurs type: AS s/p AVR.     METS/Exercise Tolerance:     Hematologic:       Musculoskeletal:       GI/Hepatic:    (-) GERD and liver disease   Renal/Genitourinary:    (-) renal disease    Endo:    (-) Type II DM and thyroid disease   Psychiatric/Substance Use:       Infectious Disease:       Malignancy:       Other:            Physical Exam    Airway        Mallampati: II   TM distance: > 3 FB   Neck ROM: full   Mouth opening: > 3 cm    Respiratory Devices and Support         Dental       (+) implants      Cardiovascular   cardiovascular exam normal          Pulmonary   pulmonary exam normal                OUTSIDE LABS:  CBC:   Lab Results   Component Value Date    HGB 14.5 01/05/2018    HGB 14.3 06/24/2015     BMP: No results found for: NA, POTASSIUM, CHLORIDE, CO2, BUN, CR, GLC  COAGS: No results found for: PTT, INR, FIBR  POC: No results found for: BGM, HCG, HCGS  HEPATIC: No results found for: ALBUMIN, PROTTOTAL, ALT, AST, GGT, ALKPHOS, BILITOTAL, BILIDIRECT, SOREN  OTHER:   Lab Results   Component Value Date    DEEPA 8.8 06/24/2015    TSH 2.61 06/24/2015       Anesthesia Plan    ASA Status:  3   NPO Status:  NPO Appropriate    Anesthesia Type: General.     - Airway: LMA   Induction: Intravenous, Propofol.   Maintenance: Balanced.        Consents    Anesthesia Plan(s) and associated risks, benefits, and realistic alternatives discussed. Questions answered and patient/representative(s) expressed understanding.    - Discussed:     - Discussed with:  Patient         Postoperative Care    Pain management: Multi-modal analgesia.   PONV prophylaxis: Ondansetron (or other 5HT-3), Background Propofol Infusion, Dexamethasone or Solumedrol     Comments:                Olayinka Staley MD

## 2023-05-22 NOTE — ANESTHESIA POSTPROCEDURE EVALUATION
Patient: Merrily A Goodell    Procedure: Procedure(s):  RIGHT FOOT BUNION REPAIR       Anesthesia Type:  General    Note:     Postop Pain Control: Uneventful            Sign Out: Well controlled pain   PONV: No   Neuro/Psych: Uneventful            Sign Out: Acceptable/Baseline neuro status   Airway/Respiratory: Uneventful            Sign Out: Acceptable/Baseline resp. status   CV/Hemodynamics: Uneventful            Sign Out: Acceptable CV status; No obvious hypovolemia; No obvious fluid overload   Other NRE: NONE   DID A NON-ROUTINE EVENT OCCUR? No           Last vitals:  Vitals Value Taken Time   /47 05/22/23 0917   Temp 36.6  C (97.8  F) 05/22/23 0846   Pulse 62 05/22/23 0921   Resp 40 05/22/23 0921   SpO2 94 % 05/22/23 0921   Vitals shown include unvalidated device data.    Electronically Signed By: Olayinka Staley MD  May 22, 2023  12:45 PM

## 2023-05-22 NOTE — ANESTHESIA CARE TRANSFER NOTE
Patient: Merrily A Goodell    Procedure: Procedure(s):  RIGHT FOOT BUNION REPAIR       Diagnosis: Bunion of right foot [M21.611]  Diagnosis Additional Information: No value filed.    Anesthesia Type:   General     Note:    Oropharynx: oropharynx clear of all foreign objects and spontaneously breathing  Level of Consciousness: awake  Oxygen Supplementation: face mask  Level of Supplemental Oxygen (L/min / FiO2): 6  Independent Airway: airway patency satisfactory and stable  Dentition: dentition unchanged  Vital Signs Stable: post-procedure vital signs reviewed and stable  Report to RN Given: handoff report given  Patient transferred to: PACU    Handoff Report: Identifed the Patient, Identified the Reponsible Provider, Reviewed the pertinent medical history, Discussed the surgical course, Reviewed Intra-OP anesthesia mangement and issues during anesthesia, Set expectations for post-procedure period and Allowed opportunity for questions and acknowledgement of understanding      Vitals:  Vitals Value Taken Time   /70 05/22/23 0846   Temp     Pulse 65 05/22/23 0848   Resp 18 05/22/23 0848   SpO2 98 % 05/22/23 0848   Vitals shown include unvalidated device data.    Electronically Signed By: MEENU Camarena CRNA  May 22, 2023  8:49 AM

## 2023-05-22 NOTE — DISCHARGE INSTRUCTIONS
Same Day Surgery Discharge Instructions for  Sedation and General Anesthesia     It's not unusual to feel dizzy, light-headed or faint for up to 24 hours after surgery or while taking pain medication.  If you have these symptoms: sit for a few minutes before standing and have someone assist you when you get up to walk or use the bathroom.    You should rest and relax for the next 24 hours. We recommend you make arrangements to have an adult stay with you for at least 24 hours after your discharge.  Avoid hazardous and strenuous activity.    DO NOT DRIVE any vehicle or operate mechanical equipment for 24 hours following the end of your surgery.  Even though you may feel normal, your reactions may be affected by the medication you have received.    Do not drink alcoholic beverages for 24 hours following surgery.     Slowly progress to your regular diet as you feel able. It's not unusual to feel nauseated and/or vomit after receiving anesthesia.  If you develop these symptoms, drink clear liquids (apple juice, ginger ale, broth, 7-up, etc. ) until you feel better.  If your nausea and vomiting persists for 24 hours, please notify your surgeon.      All narcotic pain medications, along with inactivity and anesthesia, can cause constipation. Drinking plenty of liquids and increasing fiber intake will help.    For any questions of a medical nature, call your surgeon.    Do not make important decisions for 24 hours.    If you had general anesthesia, you may have a sore throat for a couple of days related to the breathing tube used during surgery.  You may use Cepacol lozenges to help with this discomfort.  If it worsens or if you develop a fever, contact your surgeon.     If you feel your pain is not well managed with the pain medications prescribed by your surgeon, please contact your surgeon's office to let them know so they can address your concerns.        **If you have questions or concerns about your procedure,   call  Dr. Kamara at [phone]**

## 2023-05-22 NOTE — OP NOTE
Procedure Date: 05/22/2023    PREOPERATIVE DIAGNOSIS:  Moderately severe right metatarsus primus varus, hallux valgus deformity.    POSTOPERATIVE DIAGNOSIS:  Moderately severe right metatarsus primus varus, hallux valgus deformity.    SURGICAL PROCEDURE:  Boat metatarsal osteotomy and bunion repair as well as an Akin osteotomy of the proximal phalanx.    ANESTHETIC:  General.    SURGEON:  Torey Kamara MD    ASSISTANT:  Elio Brown PA-C    PREAMBLE:  Ms. Goodell presented with progressive and severe metatarsus primus varus, hallux valgus deformity.  Her hallux valgus angle was about 40 degrees with an intermetatarsal angle of 22 degrees.  She tried conservative management to no avail.  Informed consent was obtained for above-mentioned procedure.    NEED FOR AN ASSISTANT:  A skilled first assistant was necessary through all portions of the case in order to assist with positioning, retracting, wound closure, dressing and splint application.    DESCRIPTION OF PROCEDURE:  After adequate induction of a general anesthetic, the patient was positioned supine on the operating table.  The right leg was sterilely prepped and free draped in the usual fashion.  Tourniquet around the thigh was inflated to 300 mmHg.    A small incision was made in the first webspace.  An adductor hallucis release was done.  This was followed by a 7 cm incision medial over the MTP joint.  The capsule was incised longitudinally.  The medial eminence was removed with a saw.    This was followed by a 90-degree metatarsal osteotomy with a long plantar limb.  The capital fragment was translated laterally by about 6 mm and then immobilized with 2 compression screws.  The sesamoids were reduced and the medial capsule repaired.    This was followed by a 25-degree medially based closing wedge osteotomy of the proximal phalanx.  The osteotomy was secured with a suture.    This gave satisfactory alignment of her great toe.    The tourniquet was  deflated after intraoperative x-rays.  The wound was closed in layers.  A sterile dressing and a light compressive bandage applied.  She tolerated the procedure well and was taken to the recovery room in satisfactory condition.    She can ambulate weightbearing as tolerated.  Sutures will be removed in 2 weeks.    Torey Kamara MD        D: 2023   T: 2023   MT:     Name:     GOODELL, MERRILY A.  MRN:      [id number]        Account:        [id number]   :      1950           Procedure Date: 2023     Document: [id number]

## 2023-06-06 ENCOUNTER — TRANSFERRED RECORDS (OUTPATIENT)
Dept: HEALTH INFORMATION MANAGEMENT | Facility: CLINIC | Age: 73
End: 2023-06-06
Payer: COMMERCIAL

## 2024-01-22 PROBLEM — I10 HYPERTENSION: Status: ACTIVE | Noted: 2020-06-16

## 2024-01-22 PROBLEM — S52.021A CLOSED FRACTURE OF RIGHT OLECRANON PROCESS: Status: ACTIVE | Noted: 2020-11-07

## 2024-01-22 PROBLEM — S52.501A FRACTURE OF DISTAL END OF RIGHT RADIUS AND ULNA: Status: ACTIVE | Noted: 2020-10-15

## 2024-01-22 PROBLEM — Z13.6 SCREENING FOR CARDIOVASCULAR CONDITION: Status: ACTIVE | Noted: 2018-01-02

## 2024-01-22 PROBLEM — S52.601A FRACTURE OF DISTAL END OF RIGHT RADIUS AND ULNA: Status: ACTIVE | Noted: 2020-10-15
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
## 2024-01-22 PROBLEM — R29.898: Status: ACTIVE | Noted: 2020-12-02

## 2024-01-22 PROBLEM — N92.4 ABNORMAL PERIMENOPAUSAL BLEEDING: Status: ACTIVE | Noted: 2020-07-20

## 2024-01-22 PROBLEM — N92.0 MENORRHAGIA: Status: ACTIVE | Noted: 2020-07-20

## 2024-01-22 PROBLEM — R01.1 HEART MURMUR: Status: ACTIVE | Noted: 2018-01-04

## 2024-01-22 PROBLEM — I35.0 AORTIC STENOSIS: Status: ACTIVE | Noted: 2020-03-13

## 2024-01-22 PROBLEM — S82.041C: Status: ACTIVE | Noted: 2020-10-14

## 2024-01-22 PROBLEM — Z95.4: Status: ACTIVE | Noted: 2020-08-03

## 2024-01-22 PROBLEM — S52.121A CLOSED DISPLACED FRACTURE OF HEAD OF RIGHT RADIUS: Status: ACTIVE | Noted: 2020-11-07

## 2024-01-22 PROBLEM — G89.18 ACUTE POSTOPERATIVE PAIN: Status: ACTIVE | Noted: 2020-07-25

## 2024-01-22 PROBLEM — Z95.2 HISTORY OF HEART VALVE REPLACEMENT WITH MECHANICAL VALVE: Status: ACTIVE | Noted: 2020-07-22

## 2024-01-22 PROBLEM — S82.009A PATELLAR FRACTURE: Status: ACTIVE | Noted: 2020-10-15

## 2024-01-22 PROBLEM — M66.842: Status: ACTIVE | Noted: 2020-12-11

## 2024-01-22 RX ORDER — ACETAMINOPHEN 500 MG
1 TABLET ORAL DAILY
COMMUNITY
Start: 2022-03-31

## 2024-01-22 RX ORDER — MULTIVITAMIN
1 TABLET ORAL DAILY
COMMUNITY
Start: 2022-03-31

## 2024-01-22 NOTE — PROGRESS NOTES
SUBJECTIVE:                                                   Merrily A Goodell is a 73 year old female who presents to clinic today for the following health issue(s):  Patient presents with:  Gyn Exam        HPI:  Here today for her annual Wellwoman exam and mammogram.  She is also due for bone scan.  She had 2 falls this year and broke her elbow and her foot.  Her last bone scan was in 2018.  She has plans to retire in May of this year.  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  She is postmenopausal and has no vaginal bleeding.  She is on no hormone replacement therapy.  She has good bladder support except and she drinks too much caffeine but she has a little bit of urgency but no loss or leakage.    No LMP recorded. Patient is postmenopausal..     Patient is not sexually active, .  Using menopause for contraception.    reports that she has never smoked. She has never used smokeless tobacco.    STD testing offered?  Declined    Health maintenance updated:  Yes    Today's PHQ-2 Score:       2024     8:46 AM   PHQ-2 (  [employer])   Q1: Little interest or pleasure in doing things 0   Q2: Feeling down, depressed or hopeless 0   PHQ-2 Score 0     Today's PHQ-9 Score:       2024     8:46 AM   PHQ-9 SCORE   PHQ-9 Total Score 0     Today's ABHI-7 Score:       2024     8:46 AM   ABHI-7 SCORE   Total Score 0       Problem list and histories reviewed & adjusted, as indicated.  Additional history: as documented.    Patient Active Problem List   Diagnosis    Cervical cancer screening    Screening for cardiovascular condition    Patellar fracture    Open displaced comminuted fracture of right patella, type IIIA, IIIB, or IIIC    Nontraumatic rupture of tendon of left thumb    Menorrhagia    Hypertension    History of heart valve replacement with mechanical valve    History of heart valve replacement with tissue graft    Heart murmur    Fracture of distal end of right radius and ulna    Fall from ladder     Closed fracture of right olecranon process    Closed displaced fracture of head of right radius    Arm dysfunction    Aortic stenosis    Acute postoperative pain    Abnormal perimenopausal bleeding    Type I or II open comminuted fracture of proximal end of right ulna    Type 2 diabetes mellitus without complication, without long-term current use of insulin (H)    Class 2 severe obesity due to excess calories with serious comorbidity in adult (H)     Past Surgical History:   Procedure Laterality Date    AORTIC VALVE REPLACEMENT      BUNIONECTOMY Left 01/16/2023    Procedure: LEFT REVISION BUNION REPAIR AND;  Surgeon: Torey Kamraa MD;  Location: [facility] OR    BUNIONECTOMY Right 5/22/2023    Procedure: RIGHT FOOT BUNION REPAIR;  Surgeon: Torey Kamara MD;  Location: [facility] OR    ELBOW SURGERY      FOOT SURGERY      GYN SURGERY      partial hysterectomy    HYSTERECTOMY      OOPHORECTOMY Left     OOPHORECTOMY Right     ORTHOPEDIC SURGERY      hand surgery, knee surgery, wrist surgery    REMOVE HARDWARE FOOT Left 01/16/2023    Procedure: REMOVAL PLATE AND SCREWS;  Surgeon: Torey Kamara MD;  Location: [facility] OR    TONSILLECTOMY        Social History     Tobacco Use    Smoking status: Never    Smokeless tobacco: Never   Substance Use Topics    Alcohol use: Yes     Comment: rarely      Problem (# of Occurrences) Relation (Name,Age of Onset)    Heart Failure (1) Mother    Heart Disease (1) Maternal Grandfather    Cerebrovascular Disease (1) Paternal Grandmother    Myocardial Infarction (1) Brother    Colon Cancer (1) Father    Lung Cancer (1) Paternal Grandfather    Alcoholism (2) Maternal Grandmother, Brother              Current Outpatient Medications   Medication Sig    aspirin 81 MG EC tablet Take 81 mg by mouth daily    atorvastatin (LIPITOR) 80 MG tablet Take 1 tablet by mouth daily at 2 pm    calcium carbonate (OS-DEEPA) 1500 (600 Ca) MG tablet Take 600 mg by mouth    Cholecalciferol (VITAMIN D3) 25 MCG (1000  "UT) CAPS Take 1 tablet by mouth daily    ibuprofen (ADVIL/MOTRIN) 200 MG capsule Take 200 mg by mouth every 4 hours as needed for fever    losartan (COZAAR) 25 MG tablet Take 1 tablet by mouth daily at 2 pm    magnesium citrate solution (NOT currently available) Take 296 mLs by mouth once    Multiple Vitamin (ONE-A-DAY ESSENTIAL) TABS Take 1 tablet by mouth daily    Omega-3 Fatty Acids (OMEGA-3 FISH OIL PO) Take by mouth daily    OZEMPIC, 0.25 OR 0.5 MG/DOSE, 2 MG/3ML pen INJECT 0.25 MG SUBCUTANEOUSLY ONE TIME PER WEEK    Probiotic, Lactobacillus, CAPS Take 1 capsule by mouth daily    UNABLE TO FIND MEDICATION NAME: beet root     No current facility-administered medications for this visit.     Allergies   Allergen Reactions    Shellfish-Derived Products Anaphylaxis    Shrimp Flavor Anaphylaxis    Lisinopril Itching and Cough       ROS:  12 point review of systems negative other than symptoms noted below or in the HPI.  No urinary frequency or dysuria, bladder or kidney problems      OBJECTIVE:     /70   Ht 1.727 m (5' 8\")   Wt 107.1 kg (236 lb 3.2 oz)   Breastfeeding No   BMI 35.91 kg/m    Body mass index is 35.91 kg/m .    Exam:  Constitutional:  Appearance: Well nourished, well developed alert, in no acute distress  Breasts:  Inspection of Breasts:  Symmetric bilaterally.  No puckering.  No skin changes.  Palpation of Breasts and Axillae:  No masses present on palpation, no breast tenderness Axillary Lymph Nodes:  No lymphadenopathy present  Neurologic:  Mental Status:  Oriented X3.  Normal strength and tone, sensory exam grossly normal, mentation intact and speech normal.    Psychiatric:  Mentation appears normal and affect normal/bright.  Pelvic Exam:  External Genitalia:     Normal appearance for age, no discharge present, no tenderness present, no inflammatory lesions present, color normal  Vagina:     Normal vaginal vault without central or paravaginal defects, no discharge present, no inflammatory " lesions present, no masses present  Bladder:     Nontender to palpation  Urethra:   Urethral Body:  Urethra palpation normal, urethra structural support normal   Urethral Meatus:  No erythema or lesions present  Cervix:     Appearance healthy, no lesions present, nontender to palpation, no bleeding present  Uterus:     Uterus: firm, normal sized and nontender, anteverted in position.   Adnexa:     No adnexal tenderness present, no adnexal masses present  Perineum:     Perineum within normal limits, no evidence of trauma, no rashes or skin lesions present  Anus:     Anus within normal limits, no hemorrhoids present  Inguinal Lymph Nodes:     No lymphadenopathy present  Pubic Hair:     Normal pubic hair distribution for age  Genitalia and Groin:     No rashes present, no lesions present, no areas of discoloration, no masses present     In-Clinic Test Results:  No results found for this or any previous visit (from the past 24 hour(s)).    ASSESSMENT/PLAN:                                                        ICD-10-CM    1. Well woman exam  Z01.419       2. Special screening for osteoporosis  Z13.820 DX Hip/Pelvis/Spine      3. Class 2 severe obesity due to excess calories with serious comorbidity and body mass index (BMI) of 35.0 to 35.9 in adult (H)  E66.01     Z68.35           There are no Patient Instructions on file for this visit.    73-year-old postmenopausal female with a normal postmenopausal GYN exam.  Bone scan and mammogram will be updated today.  Will follow-up as appropriate.  No further Pap screening is needed.    MEENU Byrne [facility] [place]

## 2024-01-23 ENCOUNTER — ANCILLARY PROCEDURE (OUTPATIENT)
Dept: MAMMOGRAPHY | Facility: CLINIC | Age: 74
End: 2024-01-23
Payer: COMMERCIAL

## 2024-01-23 ENCOUNTER — OFFICE VISIT (OUTPATIENT)
Dept: OBGYN | Facility: CLINIC | Age: 74
End: 2024-01-23
Payer: COMMERCIAL

## 2024-01-23 ENCOUNTER — ANCILLARY PROCEDURE (OUTPATIENT)
Dept: BONE DENSITY | Facility: CLINIC | Age: 74
End: 2024-01-23
Attending: NURSE PRACTITIONER
Payer: COMMERCIAL

## 2024-01-23 VITALS
SYSTOLIC BLOOD PRESSURE: 118 MMHG | BODY MASS INDEX: 35.8 KG/M2 | DIASTOLIC BLOOD PRESSURE: 70 MMHG | HEIGHT: 68 IN | WEIGHT: 236.2 LBS

## 2024-01-23 DIAGNOSIS — Z13.820 SPECIAL SCREENING FOR OSTEOPOROSIS: ICD-10-CM

## 2024-01-23 DIAGNOSIS — Z01.419 WELL WOMAN EXAM: Primary | ICD-10-CM

## 2024-01-23 DIAGNOSIS — Z12.31 VISIT FOR SCREENING MAMMOGRAM: ICD-10-CM

## 2024-01-23 DIAGNOSIS — E66.01 CLASS 2 SEVERE OBESITY DUE TO EXCESS CALORIES WITH SERIOUS COMORBIDITY AND BODY MASS INDEX (BMI) OF 35.0 TO 35.9 IN ADULT (H): ICD-10-CM

## 2024-01-23 DIAGNOSIS — E66.812 CLASS 2 SEVERE OBESITY DUE TO EXCESS CALORIES WITH SERIOUS COMORBIDITY AND BODY MASS INDEX (BMI) OF 35.0 TO 35.9 IN ADULT (H): ICD-10-CM

## 2024-01-23 PROBLEM — E11.9 TYPE 2 DIABETES MELLITUS WITHOUT COMPLICATION, WITHOUT LONG-TERM CURRENT USE OF INSULIN (H): Status: ACTIVE | Noted: 2023-05-26

## 2024-01-23 PROBLEM — S52.091B: Status: ACTIVE | Noted: 2023-07-05

## 2024-01-23 PROCEDURE — 77080 DXA BONE DENSITY AXIAL: CPT | Mod: TC | Performed by: PHYSICIAN ASSISTANT

## 2024-01-23 PROCEDURE — 99387 INIT PM E/M NEW PAT 65+ YRS: CPT | Performed by: NURSE PRACTITIONER

## 2024-01-23 PROCEDURE — 77067 SCR MAMMO BI INCL CAD: CPT | Mod: TC | Performed by: RADIOLOGY

## 2024-01-23 PROCEDURE — 77063 BREAST TOMOSYNTHESIS BI: CPT | Mod: TC | Performed by: RADIOLOGY

## 2024-01-23 RX ORDER — SEMAGLUTIDE 0.68 MG/ML
INJECTION, SOLUTION SUBCUTANEOUS
COMMUNITY
Start: 2024-01-09

## 2024-01-23 RX ORDER — ATORVASTATIN CALCIUM 80 MG/1
1 TABLET, FILM COATED ORAL
COMMUNITY
Start: 2023-12-13

## 2024-01-23 RX ORDER — LOSARTAN POTASSIUM 25 MG/1
1 TABLET ORAL
COMMUNITY
Start: 2023-12-13

## 2024-01-23 ASSESSMENT — ANXIETY QUESTIONNAIRES
GAD7 TOTAL SCORE: 0
6. BECOMING EASILY ANNOYED OR IRRITABLE: NOT AT ALL
5. BEING SO RESTLESS THAT IT IS HARD TO SIT STILL: NOT AT ALL
7. FEELING AFRAID AS IF SOMETHING AWFUL MIGHT HAPPEN: NOT AT ALL
1. FEELING NERVOUS, ANXIOUS, OR ON EDGE: NOT AT ALL
GAD7 TOTAL SCORE: 0
IF YOU CHECKED OFF ANY PROBLEMS ON THIS QUESTIONNAIRE, HOW DIFFICULT HAVE THESE PROBLEMS MADE IT FOR YOU TO DO YOUR WORK, TAKE CARE OF THINGS AT HOME, OR GET ALONG WITH OTHER PEOPLE: NOT DIFFICULT AT ALL
2. NOT BEING ABLE TO STOP OR CONTROL WORRYING: NOT AT ALL
3. WORRYING TOO MUCH ABOUT DIFFERENT THINGS: NOT AT ALL

## 2024-01-23 ASSESSMENT — PATIENT HEALTH QUESTIONNAIRE - PHQ9
SUM OF ALL RESPONSES TO PHQ QUESTIONS 1-9: 0
5. POOR APPETITE OR OVEREATING: NOT AT ALL

## 2024-02-15 ENCOUNTER — ALLIED HEALTH/NURSE VISIT (OUTPATIENT)
Dept: OBGYN | Facility: CLINIC | Age: 74
End: 2024-02-15
Payer: COMMERCIAL

## 2024-02-15 DIAGNOSIS — M80.00XD AGE-RELATED OSTEOPOROSIS WITH CURRENT PATHOLOGICAL FRACTURE WITH ROUTINE HEALING, SUBSEQUENT ENCOUNTER: Primary | ICD-10-CM

## 2024-02-15 PROCEDURE — 96372 THER/PROPH/DIAG INJ SC/IM: CPT | Performed by: NURSE PRACTITIONER

## 2024-02-15 PROCEDURE — 99207 PR NO CHARGE NURSE ONLY: CPT

## 2024-02-15 NOTE — CONFIDENTIAL NOTE
Clinic Administered Medication Documentation      Prolia Documentation    Indication: Prolia  (denosumab) is a prescription medicine used to treat osteoporosis in patients who:   Are at high risk for fracture, meaning patients who have had a fracture related to osteoporosis, or who have multiple risk factors for fracture.  Cannot use another osteoporosis medicine or other osteoporosis medicines did not work well.  The timeline for early/late injections would be 4 weeks early and any time after the 6 month ev. If a patient receives their injection late, then the subsequent injection would be 6 months from the date that they actually received the injection.    When was the last injection?  This is FIRST injection  Was the last injection at least 6 months ago? Yes  Has the prior authorization been completed?  Yes  Is there an active order (written within the past 365 days, with administrations remaining, not ) in the chart?  Yes  Patient denies any dental work involving the bone (e.g. tooth extraction or dental implants) in the past 4 weeks?  Yes  Patient denies plans for any dental work involving the bone (e.g. tooth extraction or dental implants) in the next 4 weeks? Yes    The following steps were completed to comply with the REMS program for Prolia:  Reviewed information in the Medication Guide and Patient Counseling Chart, including the serious risks of Prolia  and the symptoms of each risk.  Advised patient to seek prompt medical attention if they have signs or symptoms of any of the serious risks.  Provided each patient a copy of the Medication Guide and Patient Brochure.    Prior to injection, verified patient identity using patient's name and date of birth. Medication was administered. Please see MAR and medication order for additional information. Patient instructed to remain in clinic for 15 minutes and report any adverse reaction to staff immediately.    Vial/Syringe: Syringe  Was this medication  supplied by the patient? No  Verified that the patient has refills remaining in their prescription.    Fara Churchill RN on 2/15/2024 at 9:29 AM

## 2024-03-30 ENCOUNTER — HEALTH MAINTENANCE LETTER (OUTPATIENT)
Age: 74
End: 2024-03-30

## 2024-08-06 ENCOUNTER — MYC MEDICAL ADVICE (OUTPATIENT)
Dept: OBGYN | Facility: CLINIC | Age: 74
End: 2024-08-06
Payer: COMMERCIAL

## 2024-08-06 NOTE — TELEPHONE ENCOUNTER
2/15/2024 1st dose of prolia    Routing pt mychart message to provider to advise.    Fara Churchill RN on 8/6/2024 at 2:54 PM

## 2024-08-17 ENCOUNTER — HEALTH MAINTENANCE LETTER (OUTPATIENT)
Age: 74
End: 2024-08-17

## 2024-12-28 ENCOUNTER — HEALTH MAINTENANCE LETTER (OUTPATIENT)
Age: 74
End: 2024-12-28

## 2025-02-23 ENCOUNTER — HEALTH MAINTENANCE LETTER (OUTPATIENT)
Age: 75
End: 2025-02-23

## 2025-03-23 ENCOUNTER — HEALTH MAINTENANCE LETTER (OUTPATIENT)
Age: 75
End: 2025-03-23

## 2025-04-13 ENCOUNTER — HEALTH MAINTENANCE LETTER (OUTPATIENT)
Age: 75
End: 2025-04-13

## 2025-07-27 ENCOUNTER — HEALTH MAINTENANCE LETTER (OUTPATIENT)
Age: 75
End: 2025-07-27

## (undated) DEVICE — SU VICRYL 0 CT-1 27" J340H

## (undated) DEVICE — DRSG ABDOMINAL 07 1/2X8" 7197D

## (undated) DEVICE — BNDG ELASTIC 4"X5YDS UNSTERILE 6611-40

## (undated) DEVICE — SUCTION CANISTER MEDIVAC LINER 3000ML W/LID 65651-530

## (undated) DEVICE — GOWN XXLG REINFORCED 9071EL

## (undated) DEVICE — BLADE SAW OSCILLATING STRYK MED 9.0X25X0.38MM 2296-003-111

## (undated) DEVICE — STPL SKIN 35W ROTATING HEAD PRW35

## (undated) DEVICE — SU ETHILON 3-0  FSLX 30" 1673H

## (undated) DEVICE — PACK EXTREMITY SOP15EXFSD

## (undated) DEVICE — CAST PADDING 4" STERILE 9044S

## (undated) DEVICE — DRAPE COVER C-ARM SEAMLESS SNAP-KAP 03-KP26 LATEX FREE

## (undated) DEVICE — SU VICRYL 1 CT-1 27" UND J261H

## (undated) DEVICE — SOL NACL 0.9% IRRIG 1000ML BOTTLE 2F7124

## (undated) DEVICE — DRILL BIT MINI 60MM QC 1.1MM  310.11

## (undated) DEVICE — DRSG ADAPTIC 3X8" 6113

## (undated) DEVICE — DRILL BIT MINI 85MM QC 1.5MM  310.15

## (undated) DEVICE — SU ETHILON 3-0 FS-1 18" 669H

## (undated) DEVICE — SOL WATER IRRIG 1000ML BOTTLE 2F7114

## (undated) DEVICE — LINEN TOWEL PACK X5 5464

## (undated) DEVICE — IMM LIMB ELEVATOR DC40-0203

## (undated) DEVICE — PREP CHLORAPREP 26ML TINTED HI-LITE ORANGE 930815

## (undated) DEVICE — PREP DURAPREP 26ML APL 8630

## (undated) RX ORDER — CEFAZOLIN SODIUM/WATER 3 G/30 ML
SYRINGE (ML) INTRAVENOUS
Status: DISPENSED
Start: 2023-05-22

## (undated) RX ORDER — PROPOFOL 10 MG/ML
INJECTION, EMULSION INTRAVENOUS
Status: DISPENSED
Start: 2023-05-22

## (undated) RX ORDER — EPHEDRINE SULFATE 50 MG/ML
INJECTION, SOLUTION INTRAMUSCULAR; INTRAVENOUS; SUBCUTANEOUS
Status: DISPENSED
Start: 2023-05-22

## (undated) RX ORDER — DEXAMETHASONE SODIUM PHOSPHATE 4 MG/ML
INJECTION, SOLUTION INTRA-ARTICULAR; INTRALESIONAL; INTRAMUSCULAR; INTRAVENOUS; SOFT TISSUE
Status: DISPENSED
Start: 2023-01-16

## (undated) RX ORDER — CEFAZOLIN SODIUM/WATER 2 G/20 ML
SYRINGE (ML) INTRAVENOUS
Status: DISPENSED
Start: 2023-01-16

## (undated) RX ORDER — FENTANYL CITRATE 0.05 MG/ML
INJECTION, SOLUTION INTRAMUSCULAR; INTRAVENOUS
Status: DISPENSED
Start: 2023-01-16

## (undated) RX ORDER — ONDANSETRON 2 MG/ML
INJECTION INTRAMUSCULAR; INTRAVENOUS
Status: DISPENSED
Start: 2023-01-16

## (undated) RX ORDER — PROPOFOL 10 MG/ML
INJECTION, EMULSION INTRAVENOUS
Status: DISPENSED
Start: 2023-01-16

## (undated) RX ORDER — FENTANYL CITRATE 50 UG/ML
INJECTION, SOLUTION INTRAMUSCULAR; INTRAVENOUS
Status: DISPENSED
Start: 2023-01-16

## (undated) RX ORDER — ONDANSETRON 2 MG/ML
INJECTION INTRAMUSCULAR; INTRAVENOUS
Status: DISPENSED
Start: 2023-05-22

## (undated) RX ORDER — FENTANYL CITRATE 50 UG/ML
INJECTION, SOLUTION INTRAMUSCULAR; INTRAVENOUS
Status: DISPENSED
Start: 2023-05-22

## (undated) RX ORDER — HYDROCODONE BITARTRATE AND ACETAMINOPHEN 5; 325 MG/1; MG/1
TABLET ORAL
Status: DISPENSED
Start: 2023-05-22

## (undated) RX ORDER — DEXAMETHASONE SODIUM PHOSPHATE 4 MG/ML
INJECTION, SOLUTION INTRA-ARTICULAR; INTRALESIONAL; INTRAMUSCULAR; INTRAVENOUS; SOFT TISSUE
Status: DISPENSED
Start: 2023-05-22

## (undated) RX ORDER — HYDROCODONE BITARTRATE AND ACETAMINOPHEN 5; 325 MG/1; MG/1
TABLET ORAL
Status: DISPENSED
Start: 2023-01-16